# Patient Record
Sex: MALE | Race: BLACK OR AFRICAN AMERICAN | NOT HISPANIC OR LATINO | URBAN - METROPOLITAN AREA
[De-identification: names, ages, dates, MRNs, and addresses within clinical notes are randomized per-mention and may not be internally consistent; named-entity substitution may affect disease eponyms.]

---

## 2019-06-28 ENCOUNTER — EMERGENCY (EMERGENCY)
Facility: HOSPITAL | Age: 69
LOS: 1 days | Discharge: HOME | End: 2019-06-28
Attending: EMERGENCY MEDICINE | Admitting: INTERNAL MEDICINE
Payer: COMMERCIAL

## 2019-06-28 VITALS
DIASTOLIC BLOOD PRESSURE: 69 MMHG | SYSTOLIC BLOOD PRESSURE: 136 MMHG | OXYGEN SATURATION: 97 % | RESPIRATION RATE: 18 BRPM | TEMPERATURE: 99 F | HEART RATE: 56 BPM

## 2019-06-28 LAB
ALBUMIN SERPL ELPH-MCNC: 3.7 G/DL — SIGNIFICANT CHANGE UP (ref 3.3–5)
ALP SERPL-CCNC: 96 U/L — SIGNIFICANT CHANGE UP (ref 40–120)
ALT FLD-CCNC: 44 U/L — HIGH (ref 4–41)
ANION GAP SERPL CALC-SCNC: 13 MMO/L — SIGNIFICANT CHANGE UP (ref 7–14)
APTT BLD: 29.8 SEC — SIGNIFICANT CHANGE UP (ref 27.5–36.3)
AST SERPL-CCNC: 29 U/L — SIGNIFICANT CHANGE UP (ref 4–40)
BASE EXCESS BLDV CALC-SCNC: 1.2 MMOL/L — SIGNIFICANT CHANGE UP
BASOPHILS # BLD AUTO: 0.02 K/UL — SIGNIFICANT CHANGE UP (ref 0–0.2)
BASOPHILS NFR BLD AUTO: 0.4 % — SIGNIFICANT CHANGE UP (ref 0–2)
BILIRUB SERPL-MCNC: 0.5 MG/DL — SIGNIFICANT CHANGE UP (ref 0.2–1.2)
BLOOD GAS VENOUS - CREATININE: 1.29 MG/DL — SIGNIFICANT CHANGE UP (ref 0.5–1.3)
BLOOD GAS VENOUS - FIO2: 21 — SIGNIFICANT CHANGE UP
BUN SERPL-MCNC: 17 MG/DL — SIGNIFICANT CHANGE UP (ref 7–23)
CALCIUM SERPL-MCNC: 9 MG/DL — SIGNIFICANT CHANGE UP (ref 8.4–10.5)
CHLORIDE BLDV-SCNC: 110 MMOL/L — HIGH (ref 96–108)
CHLORIDE SERPL-SCNC: 105 MMOL/L — SIGNIFICANT CHANGE UP (ref 98–107)
CO2 SERPL-SCNC: 22 MMOL/L — SIGNIFICANT CHANGE UP (ref 22–31)
CREAT SERPL-MCNC: 1.3 MG/DL — SIGNIFICANT CHANGE UP (ref 0.5–1.3)
EOSINOPHIL # BLD AUTO: 0.12 K/UL — SIGNIFICANT CHANGE UP (ref 0–0.5)
EOSINOPHIL NFR BLD AUTO: 2.4 % — SIGNIFICANT CHANGE UP (ref 0–6)
GAS PNL BLDV: 135 MMOL/L — LOW (ref 136–146)
GLUCOSE BLDV-MCNC: 134 MG/DL — HIGH (ref 70–99)
GLUCOSE SERPL-MCNC: 139 MG/DL — HIGH (ref 70–99)
HCO3 BLDV-SCNC: 24 MMOL/L — SIGNIFICANT CHANGE UP (ref 20–27)
HCT VFR BLD CALC: 40 % — SIGNIFICANT CHANGE UP (ref 39–50)
HCT VFR BLDV CALC: 41.9 % — SIGNIFICANT CHANGE UP (ref 39–51)
HGB BLD-MCNC: 12.9 G/DL — LOW (ref 13–17)
HGB BLDV-MCNC: 13.7 G/DL — SIGNIFICANT CHANGE UP (ref 13–17)
IMM GRANULOCYTES NFR BLD AUTO: 0.4 % — SIGNIFICANT CHANGE UP (ref 0–1.5)
INR BLD: 1.31 — HIGH (ref 0.88–1.17)
LACTATE BLDV-MCNC: 1.8 MMOL/L — SIGNIFICANT CHANGE UP (ref 0.5–2)
LYMPHOCYTES # BLD AUTO: 1.4 K/UL — SIGNIFICANT CHANGE UP (ref 1–3.3)
LYMPHOCYTES # BLD AUTO: 27.5 % — SIGNIFICANT CHANGE UP (ref 13–44)
MCHC RBC-ENTMCNC: 30.3 PG — SIGNIFICANT CHANGE UP (ref 27–34)
MCHC RBC-ENTMCNC: 32.3 % — SIGNIFICANT CHANGE UP (ref 32–36)
MCV RBC AUTO: 93.9 FL — SIGNIFICANT CHANGE UP (ref 80–100)
MONOCYTES # BLD AUTO: 0.5 K/UL — SIGNIFICANT CHANGE UP (ref 0–0.9)
MONOCYTES NFR BLD AUTO: 9.8 % — SIGNIFICANT CHANGE UP (ref 2–14)
NEUTROPHILS # BLD AUTO: 3.03 K/UL — SIGNIFICANT CHANGE UP (ref 1.8–7.4)
NEUTROPHILS NFR BLD AUTO: 59.5 % — SIGNIFICANT CHANGE UP (ref 43–77)
NRBC # FLD: 0 K/UL — SIGNIFICANT CHANGE UP (ref 0–0)
PCO2 BLDV: 46 MMHG — SIGNIFICANT CHANGE UP (ref 41–51)
PH BLDV: 7.37 PH — SIGNIFICANT CHANGE UP (ref 7.32–7.43)
PLATELET # BLD AUTO: 191 K/UL — SIGNIFICANT CHANGE UP (ref 150–400)
PMV BLD: 11.3 FL — SIGNIFICANT CHANGE UP (ref 7–13)
PO2 BLDV: 42 MMHG — HIGH (ref 35–40)
POTASSIUM BLDV-SCNC: 4.1 MMOL/L — SIGNIFICANT CHANGE UP (ref 3.4–4.5)
POTASSIUM SERPL-MCNC: 4.6 MMOL/L — SIGNIFICANT CHANGE UP (ref 3.5–5.3)
POTASSIUM SERPL-SCNC: 4.6 MMOL/L — SIGNIFICANT CHANGE UP (ref 3.5–5.3)
PROT SERPL-MCNC: 6.3 G/DL — SIGNIFICANT CHANGE UP (ref 6–8.3)
PROTHROM AB SERPL-ACNC: 15 SEC — HIGH (ref 9.8–13.1)
RBC # BLD: 4.26 M/UL — SIGNIFICANT CHANGE UP (ref 4.2–5.8)
RBC # FLD: 15 % — HIGH (ref 10.3–14.5)
SAO2 % BLDV: 68 % — SIGNIFICANT CHANGE UP (ref 60–85)
SODIUM SERPL-SCNC: 140 MMOL/L — SIGNIFICANT CHANGE UP (ref 135–145)
TROPONIN T, HIGH SENSITIVITY: 62 NG/L — CRITICAL HIGH (ref ?–14)
WBC # BLD: 5.09 K/UL — SIGNIFICANT CHANGE UP (ref 3.8–10.5)
WBC # FLD AUTO: 5.09 K/UL — SIGNIFICANT CHANGE UP (ref 3.8–10.5)

## 2019-06-28 PROCEDURE — 99285 EMERGENCY DEPT VISIT HI MDM: CPT

## 2019-06-28 RX ORDER — ASPIRIN/CALCIUM CARB/MAGNESIUM 324 MG
162 TABLET ORAL DAILY
Refills: 0 | Status: DISCONTINUED | OUTPATIENT
Start: 2019-06-28 | End: 2019-06-29

## 2019-06-28 NOTE — ED PROVIDER NOTE - NS ED ROS FT
General: no fevers or chills  Head: no headache or lightheadedness  Eyes: no vision change  ENT: no dysphagia  CV: +exertional chest pain  Resp: +exertional SOB  GI: no N/V/D, no abdominal pain, no blood in stool  : no dysuria  MSK: no joint pain  Skin: no new rash  Neuro: no focal weakness, no change in sensation

## 2019-06-28 NOTE — ED PROVIDER NOTE - PHYSICAL EXAMINATION
General: well-appearing man in no acute distress  Head: normocephalic, atraumatic  Eyes: PERRL  Mouth: moist mucous membranes  Neck: supple neck  CV: normal rate and rhythm, normal S1 and S2  Respiratory: clear to auscultation bilaterally  Abdomen: soft, nontender, nondistended  Back: no midline tenderness to palpation, no CVAT  Neuro: alert and oriented x3, speech clear, moving all extremities spontaneously  Skin: no rash on chest  Extremities: no LE edema or tenderness to palpation, peripheral pulses 2+ bilaterally

## 2019-06-28 NOTE — ED PROVIDER NOTE - PROGRESS NOTE DETAILS
Jadyn Hyaden, resident MD: micah trop. will admit to tele for further cardiac workup. discussed with pt.

## 2019-06-28 NOTE — ED ADULT NURSE NOTE - OBJECTIVE STATEMENT
pt on bed aox3, c/o chest pain, non radiating, for 1 month or so, worse with exertion/Activity, walking few blocks associated with SOB. had been going on for a while and Relatives to visit ED to evaluate. Denies HA dizziness Palpitation N V sweating. PMHx of AICD for Bradycardia/Silent Heart Attack? HTN HLD. on cm sinus with PVC, waiting for MD to [place].

## 2019-06-28 NOTE — ED ADULT TRIAGE NOTE - CHIEF COMPLAINT QUOTE
BIBEMS from home c/o SOB and CP when he walks x 2 days. Hx: HTN, pacemaker. 162mg Aspirin and 1 nitro spray. As per pt, CP has now subsided. Appears comfortable. Respirations even/unlabored.

## 2019-06-28 NOTE — ED PROVIDER NOTE - OBJECTIVE STATEMENT
70yo man PMH afib on eliquis s/p PPM, ICD, CAD s/p stent, HTN, HLD presents with chest pain and SOB x 1 month. Pain starts when walking and resolves with rest in about 15-20 minutes,. He has intermittent diaphoresis but not usually associated with the chest pain. Pt received 162mg asa and nitro from EMS on the way to hospital. The pain is a tightness that is midsternal and does not radiate anywhere. No n/v. No trauma, no rash. No recent hospitalizations or surgeries, recently came back from [place] 3 days ago but no leg swelling or pain, fevers, or history of DVTs.  Cardiologist: Dr. Irwin, Dr. Bragg  Most recent stress test: nuclear stress test 2-3 years ago

## 2019-06-28 NOTE — ED PROVIDER NOTE - ATTENDING CONTRIBUTION TO CARE
Attending note:   After face to face evaluation of this patient, I concur with above noted hx, pe, and care plan for this patient.  70 y/o M with three stents, afl., eloquis, no asa or plavix with 1 month of daily chest pressure on walking more than half a block.     Evaluation in progress

## 2019-06-28 NOTE — ED ADULT NURSE NOTE - INTERVENTIONS DEFINITIONS
[place] to call system/Call bell, personal items and telephone within reach/Instruct patient to call for assistance

## 2019-06-29 VITALS — WEIGHT: 233.91 LBS | HEIGHT: 72 IN

## 2019-06-29 DIAGNOSIS — Z95.810 PRESENCE OF AUTOMATIC (IMPLANTABLE) CARDIAC DEFIBRILLATOR: Chronic | ICD-10-CM

## 2019-06-29 DIAGNOSIS — Z29.9 ENCOUNTER FOR PROPHYLACTIC MEASURES, UNSPECIFIED: ICD-10-CM

## 2019-06-29 DIAGNOSIS — Z98.890 OTHER SPECIFIED POSTPROCEDURAL STATES: Chronic | ICD-10-CM

## 2019-06-29 DIAGNOSIS — R07.89 OTHER CHEST PAIN: ICD-10-CM

## 2019-06-29 DIAGNOSIS — I10 ESSENTIAL (PRIMARY) HYPERTENSION: ICD-10-CM

## 2019-06-29 DIAGNOSIS — R07.9 CHEST PAIN, UNSPECIFIED: ICD-10-CM

## 2019-06-29 DIAGNOSIS — I50.9 HEART FAILURE, UNSPECIFIED: ICD-10-CM

## 2019-06-29 DIAGNOSIS — I48.91 UNSPECIFIED ATRIAL FIBRILLATION: ICD-10-CM

## 2019-06-29 DIAGNOSIS — E78.5 HYPERLIPIDEMIA, UNSPECIFIED: ICD-10-CM

## 2019-06-29 RX ORDER — ACETAMINOPHEN 500 MG
650 TABLET ORAL EVERY 6 HOURS
Refills: 0 | Status: DISCONTINUED | OUTPATIENT
Start: 2019-06-29 | End: 2019-06-29

## 2019-06-29 RX ORDER — EPLERENONE 50 MG/1
50 TABLET, FILM COATED ORAL DAILY
Refills: 0 | Status: DISCONTINUED | OUTPATIENT
Start: 2019-06-29 | End: 2019-06-29

## 2019-06-29 RX ORDER — AMLODIPINE BESYLATE 2.5 MG/1
5 TABLET ORAL DAILY
Refills: 0 | Status: DISCONTINUED | OUTPATIENT
Start: 2019-06-29 | End: 2019-06-29

## 2019-06-29 RX ORDER — METOPROLOL TARTRATE 50 MG
25 TABLET ORAL DAILY
Refills: 0 | Status: DISCONTINUED | OUTPATIENT
Start: 2019-06-29 | End: 2019-06-29

## 2019-06-29 RX ORDER — HEPARIN SODIUM 5000 [USP'U]/ML
INJECTION INTRAVENOUS; SUBCUTANEOUS
Qty: 25000 | Refills: 0 | Status: DISCONTINUED | OUTPATIENT
Start: 2019-06-29 | End: 2019-06-29

## 2019-06-29 RX ORDER — LISINOPRIL 2.5 MG/1
20 TABLET ORAL DAILY
Refills: 0 | Status: DISCONTINUED | OUTPATIENT
Start: 2019-06-29 | End: 2019-06-29

## 2019-06-29 RX ORDER — CLOPIDOGREL BISULFATE 75 MG/1
75 TABLET, FILM COATED ORAL DAILY
Refills: 0 | Status: DISCONTINUED | OUTPATIENT
Start: 2019-06-29 | End: 2019-06-29

## 2019-06-29 RX ORDER — HEPARIN SODIUM 5000 [USP'U]/ML
6000 INJECTION INTRAVENOUS; SUBCUTANEOUS EVERY 6 HOURS
Refills: 0 | Status: DISCONTINUED | OUTPATIENT
Start: 2019-06-29 | End: 2019-06-29

## 2019-06-29 RX ORDER — ASPIRIN/CALCIUM CARB/MAGNESIUM 324 MG
81 TABLET ORAL DAILY
Refills: 0 | Status: DISCONTINUED | OUTPATIENT
Start: 2019-06-29 | End: 2019-06-29

## 2019-06-29 RX ORDER — ATORVASTATIN CALCIUM 80 MG/1
80 TABLET, FILM COATED ORAL AT BEDTIME
Refills: 0 | Status: DISCONTINUED | OUTPATIENT
Start: 2019-06-29 | End: 2019-06-29

## 2019-06-29 RX ORDER — HEPARIN SODIUM 5000 [USP'U]/ML
5000 INJECTION INTRAVENOUS; SUBCUTANEOUS ONCE
Refills: 0 | Status: DISCONTINUED | OUTPATIENT
Start: 2019-06-29 | End: 2019-06-29

## 2019-06-29 RX ADMIN — Medication 162 MILLIGRAM(S): at 00:08

## 2019-06-29 NOTE — H&P ADULT - NSICDXPASTMEDICALHX_GEN_ALL_CORE_FT
PAST MEDICAL HISTORY:  Atrial fibrillation     CAD (coronary artery disease)     Congestive heart failure (CHF)     Hyperlipidemia     Hypertension

## 2019-06-29 NOTE — H&P ADULT - HISTORY OF PRESENT ILLNESS
70 y/o male, with a PmHx of Afib on Eliquis, ICD (Medtronic), CHF, CAD w/stents (last stent placed 9/18 @ [facility]), HTN, HLD, presented to the [facility] with chest pain and diaphoresis. Pt states he has been having intermittent, substernal, non-radiating, 6/10 chest pain for the past 3-4 weeks. He states he had just  come back here from [place] 3 days ago and was still having the chest pain so his children told him to go to the hospital for an evaluation. He states the pain is worse with exertion and is associated with sob and diaphoresis and when he sits down and relaxes his symptoms resolve. He denies any fever, chills, HA, dizziness, blurred vision, n/v, sick contacts. Currently, he states the chest pain is a 0/10. Pt appears comfortable at this time and was admitted to telemetry for r/o acs.

## 2019-06-29 NOTE — H&P ADULT - NEGATIVE OPHTHALMOLOGIC SYMPTOMS
no photophobia/no loss of vision L/no blurred vision L/no blurred vision R/no loss of vision R/no diplopia

## 2019-06-29 NOTE — H&P ADULT - NSICDXPASTSURGICALHX_GEN_ALL_CORE_FT
PAST SURGICAL HISTORY:  AICD (automatic cardioverter/defibrillator) present Medtronic    History of cardiac cath with 3 stents

## 2019-06-29 NOTE — PROGRESS NOTE ADULT - SUBJECTIVE AND OBJECTIVE BOX
CC: pt seen and examined, please see full H n P to follow.     68yo man PMH afib on eliquis s/p PPM, ICD, CAD s/p stent, HTN, HLD presents with chest pain and SOB x 1 month. Pain starts when walking and resolves with rest in about 15-20 minutes,. He has intermittent diaphoresis but not usually associated with these episodes. Pt currently feeling better, denies chest pain at rest    ECG Vpaced, aflutter    TELEMETRY: aflutter    PHYSICAL EXAM:    T(C): 36.6 (06-29-19 @ 06:06), Max: 37 (06-28-19 @ 18:39)  HR: 50 (06-29-19 @ 06:06) (50 - 56)  BP: 158/91 (06-29-19 @ 06:06) (131/74 - 158/91)  RR: 17 (06-29-19 @ 06:06) (16 - 20)  SpO2: 100% (06-29-19 @ 06:06) (97% - 100%)  Wt(kg): --  I&O's Summary      Appearance: Normal	  Cardiovascular: Normal S1 S2,RRR, No JVD, No murmurs  Respiratory: Lungs clear to auscultation	  Gastrointestinal:  Soft, Non-tender, + BS	  Extremities: Normal range of motion, No clubbing, cyanosis or edema  Vascular: Peripheral pulses palpable 2+ bilaterally     LABS:	 	                          12.9   5.09  )-----------( 191      ( 28 Jun 2019 22:00 )             40.0     06-28    140  |  105  |  17  ----------------------------<  139<H>  4.6   |  22  |  1.30    Ca    9.0      28 Jun 2019 22:00    TPro  6.3  /  Alb  3.7  /  TBili  0.5  /  DBili  x   /  AST  29  /  ALT  44<H>  /  AlkPhos  96  06-28      PT/INR - ( 28 Jun 2019 22:00 )   PT: 15.0 SEC;   INR: 1.31          PTT - ( 28 Jun 2019 22:00 )  PTT:29.8 SEC    CARDIAC MARKERS:

## 2019-06-29 NOTE — H&P ADULT - RS GEN PE MLT RESP DETAILS PC
good air movement/airway patent/breath sounds equal/clear to auscultation bilaterally/respirations non-labored/no chest wall tenderness

## 2019-06-29 NOTE — H&P ADULT - NSICDXFAMILYHX_GEN_ALL_CORE_FT
FAMILY HISTORY:  Family history of heart disease in brother  Family history of pancreatic cancer, Father  from pancreatic cancer

## 2019-06-29 NOTE — DISCHARGE NOTE PROVIDER - NSDCCPCAREPLAN_GEN_ALL_CORE_FT
PRINCIPAL DISCHARGE DIAGNOSIS  Diagnosis: Other chest pain  Assessment and Plan of Treatment: To be asymptomatic, to reduce risks factors such as hypertension, diabetes and hyperlipidemia to lower the risk of blood clots formation; and to prevent complications of coronary artery disease such as worsening chest pain, heart attack and death.   Continue aspirin and Plavix, do not stop unless instructed by your physician.  Continue low salt, fat, cholesterol and carbohydrate diet. Follow up with cardiologist and primary care physician's routine appointment.      SECONDARY DISCHARGE DIAGNOSES  Diagnosis: Hyperlipidemia  Assessment and Plan of Treatment: To maintain normal cholesterol levels to prevent stroke, coronary artery disease, peripheral vascular disease and heart attacks.   Low fat diet, exercise daily and continue current medications. Follow up with primary care physician and cardiologist for management.    Diagnosis: Atrial fibrillation  Assessment and Plan of Treatment: To restore or maintain a normal heart rate and rhythm, to prevent blood clots, and decrease the risks of stroke CVA/TIA.   Please take your medications as prescribed.  Continue to take your blood thinner as prescribed and follow with your physician to monitor your levels.  Low fat diet, reduce caffeine intake, and exercise at least 30 minutes daily.    Diagnosis: Congestive heart failure (CHF)  Assessment and Plan of Treatment: To relieve and prevent worsening symptoms associated with congestive heart failure, to improve quality of life, and to treat underlying conditions such as coronary heart disease, high blood pressure, or diabetes, and to maintain euvolemia.   Low salt diet, fluid restriction to 1500 ml daily, monitor your fluid intake and weight daily, exercise as tolerated 30 minutes daily, and follow up with your physician within 1 to 2 weeks.    Diagnosis: Hypertension  Assessment and Plan of Treatment: To maintain a normal blood pressure to prevent heart attack, stroke and renal failure.   Low sodium and fat diet, continue anti-hypertensive medications, and follow up with primary care physician.

## 2019-06-29 NOTE — PROGRESS NOTE ADULT - ASSESSMENT
1. NSTEMI  2. CAD s/p PCI  3. AFluttler/Fib  4. S/P PPM    -Plan for cardiac cath on Monday  -Hold oral anticoagulant and start heparin  -asa, plavix  -cont prior CV meds  -prelim CXR LLL opacity? check noncon CT chest  -med eval  -ECHO

## 2019-06-29 NOTE — PROVIDER CONTACT NOTE (OTHER) - ASSESSMENT
Pt does not want any blood drawn and is refusing the heparin drip. risks discussed with pt. Monty Langley made aware

## 2019-06-29 NOTE — H&P ADULT - PROBLEM SELECTOR PLAN 2
cont metoprolol for rate control, ICD interrogation to be called (medtronic); CHADSVASC of 4, hold eliquis for now for plan of cardiac cath on monday, started on heparin gtt

## 2019-06-29 NOTE — H&P ADULT - PROBLEM SELECTOR PLAN 1
ekg/telemetry, ce x 2, tsh, echo, plan is for cardiac cath on Monday, hold eliquis, started on heparin gtt, monitor ptt, cont asa and plavix, statin

## 2019-06-29 NOTE — DISCHARGE NOTE PROVIDER - CARE PROVIDER_API CALL
Dr. Bernard Lopes,   Cardiologist  [facility]  Phone: [phone]  Fax: (   )    -  Follow Up Time:

## 2019-06-29 NOTE — H&P ADULT - ASSESSMENT
68 y/o male, with a PmHx of Afib on Eliquis, ICD (Medtronic), CHF, CAD w/stents (last stent placed 9/18 @ [facility]), HTN, HLD, presented to the [facility] with chest pain and diaphoresis. Admitted to telemetry for r/o acs.

## 2019-06-29 NOTE — H&P ADULT - NSHPPHYSICALEXAM_GEN_ALL_CORE
Vital Signs Last 24 Hrs  T(C): 36.6 (29 Jun 2019 06:06), Max: 37 (28 Jun 2019 18:39)  T(F): 97.9 (29 Jun 2019 06:06), Max: 98.6 (28 Jun 2019 18:39)  HR: 50 (29 Jun 2019 06:06) (50 - 56)  BP: 158/91 (29 Jun 2019 06:06) (131/74 - 158/91)  BP(mean): --  RR: 17 (29 Jun 2019 06:06) (16 - 20)  SpO2: 100% (29 Jun 2019 06:06) (97% - 100%)    EKG: V-Paced @ 56, T inv I, AVL, V2

## 2019-06-29 NOTE — DISCHARGE NOTE PROVIDER - HOSPITAL COURSE
70 y/o male, with a PmHx of Afib on Eliquis, ICD (Medtronic), CHF, CAD w/stents (last stent placed 9/18 @ [facility]), HTN, HLD, presented to the [facility] with chest pain and diaphoresis. Pt states he has been having intermittent, substernal, non-radiating, 6/10 chest pain for the past 3-4 weeks. He states he had just  come back here from [place] 3 days ago and was still having the chest pain so his children told him to go to the hospital for an evaluation. He states the pain is worse with exertion and is associated with sob and diaphoresis and when he sits down and relaxes his symptoms resolve. He denies any fever, chills, HA, dizziness, blurred vision, n/v, sick contacts. Currently, he states the chest pain is a 0/10. Pt appears comfortable at this time and was admitted to telemetry for r/o acs.        Shortly after being admitted, I was called by the nurse Yary to evaluate this patient who wishes to leave the hospital against medical advice. Patient is A&O x3 and has full capacity to make his or her own medical decisions. He was refusing all his tests and labs. Family was at his bedside trying to convince him to stay as well but he was still refusing. Pt was informed of their medical conditions, benefits, and alternatives to treatment as well as the risks of refusing treatment and the seriousness of leaving against medical advice such as the risks of heart attack, stroke, seizure, and even death were fully explained to the patient.  After expressing full understanding, patient then signs out against medical advice witnessed by the nurse.  Attending made aware.

## 2019-06-29 NOTE — DISCHARGE NOTE PROVIDER - PROVIDER TOKENS
FREE:[LAST:[Dr. Bernard Lopes],PHONE:[phone]],FAX:[(   )    -],ADDRESS:[Cardiologist  [facility]]]

## 2019-06-29 NOTE — H&P ADULT - NSHPSOCIALHISTORY_GEN_ALL_CORE
Marital Status:     Occupation: Retired, used to own a car service     Tobacco Use: neg    ETOH Use: neg    Flu Vaccine:     neg                             Pneumonia Vaccine: neg

## 2022-06-20 NOTE — PATIENT PROFILE ADULT - FALL HARM RISK CONCLUSION
"Large Joint Aspiration/Injection: L knee    Date/Time: 6/20/2022 1:15 PM  Performed by: Sam Fine MD  Authorized by: Sam Fine MD     Consent Done?:  Yes (Verbal)  Indications:  Pain  Site marked: the procedure site was marked    Timeout: prior to procedure the correct patient, procedure, and site was verified    Prep: patient was prepped and draped in usual sterile fashion    Local anesthesia used?: No    Local anesthetic:  Topical anesthetic    Details:  Needle Size:  22 G  Ultrasonic Guidance for needle placement?: Yes    Images are saved and documented.  Approach: superiorlateral.  Location:  Knee  Site:  L knee  Medications:  48 mg hylan g-f 20 48 mg/6 mL  Patient tolerance:  Patient tolerated the procedure well with no immediate complications     Description of ultrasound utilization for needle guidance:   Ultrasound guidance used for needle localization. Images saved and stored for documentation. The knee joint was visualized. Dynamic visualization of the 22g x 1.5" needle was continuous throughout the procedure.       " Fall Risk

## 2022-10-26 ENCOUNTER — EMERGENCY (EMERGENCY)
Facility: HOSPITAL | Age: 72
LOS: 1 days | Discharge: HOME | End: 2022-10-26
Attending: EMERGENCY MEDICINE | Admitting: EMERGENCY MEDICINE

## 2022-10-26 VITALS
DIASTOLIC BLOOD PRESSURE: 90 MMHG | SYSTOLIC BLOOD PRESSURE: 159 MMHG | HEART RATE: 97 BPM | TEMPERATURE: 98 F | RESPIRATION RATE: 16 BRPM | OXYGEN SATURATION: 100 % | HEIGHT: 72 IN

## 2022-10-26 VITALS
RESPIRATION RATE: 16 BRPM | TEMPERATURE: 98 F | HEART RATE: 80 BPM | DIASTOLIC BLOOD PRESSURE: 89 MMHG | OXYGEN SATURATION: 100 % | SYSTOLIC BLOOD PRESSURE: 147 MMHG

## 2022-10-26 DIAGNOSIS — Z98.890 OTHER SPECIFIED POSTPROCEDURAL STATES: Chronic | ICD-10-CM

## 2022-10-26 DIAGNOSIS — Z95.810 PRESENCE OF AUTOMATIC (IMPLANTABLE) CARDIAC DEFIBRILLATOR: Chronic | ICD-10-CM

## 2022-10-26 PROBLEM — I25.10 ATHEROSCLEROTIC HEART DISEASE OF NATIVE CORONARY ARTERY WITHOUT ANGINA PECTORIS: Chronic | Status: ACTIVE | Noted: 2019-06-29

## 2022-10-26 PROBLEM — I48.91 UNSPECIFIED ATRIAL FIBRILLATION: Chronic | Status: ACTIVE | Noted: 2019-06-29

## 2022-10-26 PROBLEM — E78.5 HYPERLIPIDEMIA, UNSPECIFIED: Chronic | Status: ACTIVE | Noted: 2019-06-29

## 2022-10-26 PROBLEM — I10 ESSENTIAL (PRIMARY) HYPERTENSION: Chronic | Status: ACTIVE | Noted: 2019-06-29

## 2022-10-26 PROBLEM — I50.9 HEART FAILURE, UNSPECIFIED: Chronic | Status: ACTIVE | Noted: 2019-06-29

## 2022-10-26 LAB
ALBUMIN SERPL ELPH-MCNC: 3.5 G/DL — SIGNIFICANT CHANGE UP (ref 3.3–5)
ALP SERPL-CCNC: 116 U/L — SIGNIFICANT CHANGE UP (ref 40–120)
ALT FLD-CCNC: 31 U/L — SIGNIFICANT CHANGE UP (ref 4–41)
ANION GAP SERPL CALC-SCNC: 13 MMOL/L — SIGNIFICANT CHANGE UP (ref 7–14)
AST SERPL-CCNC: 35 U/L — SIGNIFICANT CHANGE UP (ref 4–40)
BILIRUB SERPL-MCNC: 0.7 MG/DL — SIGNIFICANT CHANGE UP (ref 0.2–1.2)
BUN SERPL-MCNC: 14 MG/DL — SIGNIFICANT CHANGE UP (ref 7–23)
CALCIUM SERPL-MCNC: 8.9 MG/DL — SIGNIFICANT CHANGE UP (ref 8.4–10.5)
CHLORIDE SERPL-SCNC: 105 MMOL/L — SIGNIFICANT CHANGE UP (ref 98–107)
CO2 SERPL-SCNC: 23 MMOL/L — SIGNIFICANT CHANGE UP (ref 22–31)
CREAT SERPL-MCNC: 1.23 MG/DL — SIGNIFICANT CHANGE UP (ref 0.5–1.3)
EGFR: 63 ML/MIN/1.73M2 — SIGNIFICANT CHANGE UP
FLUAV AG NPH QL: SIGNIFICANT CHANGE UP
FLUBV AG NPH QL: SIGNIFICANT CHANGE UP
GLUCOSE SERPL-MCNC: 127 MG/DL — HIGH (ref 70–99)
HCT VFR BLD CALC: 40.4 % — SIGNIFICANT CHANGE UP (ref 39–50)
HGB BLD-MCNC: 12.8 G/DL — LOW (ref 13–17)
MAGNESIUM SERPL-MCNC: 1.8 MG/DL — SIGNIFICANT CHANGE UP (ref 1.6–2.6)
MCHC RBC-ENTMCNC: 28.8 PG — SIGNIFICANT CHANGE UP (ref 27–34)
MCHC RBC-ENTMCNC: 31.7 GM/DL — LOW (ref 32–36)
MCV RBC AUTO: 90.8 FL — SIGNIFICANT CHANGE UP (ref 80–100)
NRBC # BLD: 0 /100 WBCS — SIGNIFICANT CHANGE UP (ref 0–0)
NRBC # FLD: 0 K/UL — SIGNIFICANT CHANGE UP (ref 0–0)
NT-PROBNP SERPL-SCNC: 3920 PG/ML — HIGH
PHOSPHATE SERPL-MCNC: 3.3 MG/DL — SIGNIFICANT CHANGE UP (ref 2.5–4.5)
PLATELET # BLD AUTO: 209 K/UL — SIGNIFICANT CHANGE UP (ref 150–400)
POTASSIUM SERPL-MCNC: 4 MMOL/L — SIGNIFICANT CHANGE UP (ref 3.5–5.3)
POTASSIUM SERPL-SCNC: 4 MMOL/L — SIGNIFICANT CHANGE UP (ref 3.5–5.3)
PROT SERPL-MCNC: 5.7 G/DL — LOW (ref 6–8.3)
RBC # BLD: 4.45 M/UL — SIGNIFICANT CHANGE UP (ref 4.2–5.8)
RBC # FLD: 14.6 % — HIGH (ref 10.3–14.5)
RSV RNA NPH QL NAA+NON-PROBE: SIGNIFICANT CHANGE UP
SARS-COV-2 RNA SPEC QL NAA+PROBE: SIGNIFICANT CHANGE UP
SODIUM SERPL-SCNC: 141 MMOL/L — SIGNIFICANT CHANGE UP (ref 135–145)
WBC # BLD: 6.36 K/UL — SIGNIFICANT CHANGE UP (ref 3.8–10.5)
WBC # FLD AUTO: 6.36 K/UL — SIGNIFICANT CHANGE UP (ref 3.8–10.5)

## 2022-10-26 PROCEDURE — 93010 ELECTROCARDIOGRAM REPORT: CPT

## 2022-10-26 PROCEDURE — 99284 EMERGENCY DEPT VISIT MOD MDM: CPT

## 2022-10-26 PROCEDURE — 71045 X-RAY EXAM CHEST 1 VIEW: CPT | Mod: 26

## 2022-10-26 RX ORDER — FUROSEMIDE 40 MG
40 TABLET ORAL ONCE
Refills: 0 | Status: COMPLETED | OUTPATIENT
Start: 2022-10-26 | End: 2022-10-26

## 2022-10-26 RX ADMIN — Medication 40 MILLIGRAM(S): at 05:45

## 2022-10-26 NOTE — ED PROVIDER NOTE - SKIN NEGATIVE STATEMENT, MLM
Flexible bronchoscopy; EBUS w/LN biopsy, frozen non-diagnostic. Mediastinoscopy via cervical approach, frozen c/w benign, reactive tissue. no abrasions, no jaundice, no lesions, no pruritis, and no rashes.

## 2022-10-26 NOTE — ED POST DISCHARGE NOTE - REASON FOR FOLLOW-UP
Other Patient family called, state that patient left ED prior to results being back. Asking for results of blood work. Family and patient aware the BNP was elevated but CXR appeared clear. Other labs wnl. State that patient still symptomatic with GRIFFITH and LE edema, aware that if patient not feeling well and or having worsening symptoms to return to ED.

## 2022-10-26 NOTE — ED PROVIDER NOTE - ATTENDING CONTRIBUTION TO CARE
73 yo M with PMH HTN HLD CAD Afib ACID and CHF presenting with 3 days of a productive cough with yellow sputum and some GRIFFITH that developed in the last 2 days.  Mild and only when taking walks.  No fevers, no sick contacts and no CP.  No reported leg swelling    Gen: Well appearing in NAD  Head: NC/AT  Neck: trachea midline  Resp:  No distress CTAB  Ext: no deformities no edema   Neuro:  A&O appears non focal  Skin:  Warm and dry as visualized  Psych:  Normal affect and mood     73 yo with PMH Heart disease and CHF presenting with cough and mild SOB.  Differential includes but is not limited to viral illness, PNA, mild CHF exacerbation.  VS are stable and appears well with no PE findings of overt failure that would require admission.  Will also get trop to look for an ACS but would be less likely.

## 2022-10-26 NOTE — ED PROVIDER NOTE - PHYSICAL EXAMINATION
GENERAL: well appearing in no acute distress, non-toxic appearing  CARDIAC: regular rate and rhythm, normal S1S2, no appreciable murmurs, 2+ pulses in UE/LE b/l  PULM: normal breath sounds, clear to ascultation bilaterally, no rales, rhonchi, wheezing  GI: Abd soft, nondistended, nontender, no rebound tenderness, no guarding, no rigidity  : no CVA tenderness b/l, no suprapubic tenderness  MSK: ROM intact, +mild b/l edema up to ankles, no calf tenderness b/l  SKIN: well-perfused, extremities warm, no visible rashes  PSYCH: appropriate mood and affect

## 2022-10-26 NOTE — ED PROVIDER NOTE - OBJECTIVE STATEMENT
72M w/ h/o 72M w/ h/o HTN, HLD, CHF, CAD, Afib, AICD who presents with several days of cough and GRIFFITH. Cough began 3 days ago, productive with yellow sputum. Over last 2 days noted some GRIFFITH while taking walks. No sick contacts. Denies CP, fever/chills, sore throat, back pain, abd pain, N/V.

## 2022-10-26 NOTE — ED PROVIDER NOTE - CLINICAL SUMMARY MEDICAL DECISION MAKING FREE TEXT BOX
72M w/ h/o HTN, HLD, CHF, CAD, Afib, AICD who presents with several days of cough and GRIFFITH. Cough began 3 days ago, productive with yellow sputum. Over last 2 days noted some GRIFFITH while taking walks. No sick contacts. Denies CP, fever/chills, sore throat, back pain, abd pain, N/V. AVSS, Lungs CTA b/l, mild b/l LE edema up to ankles, no calf tenderness or swelling. Viral URI vs PNA vs mild CHF exacerbation. Will get labs, EKG, CXR, +/- give meds, and reassess.

## 2022-10-26 NOTE — ED ADULT NURSE NOTE - CHPI ED NUR SYMPTOMS NEG
no body aches/no chest pain/no chills/no edema/no fever/no headache/no hemoptysis/no shortness of breath

## 2022-10-26 NOTE — ED PROVIDER NOTE - PATIENT PORTAL LINK FT
You can access the FollowMyHealth Patient Portal offered by [facility] by registering at the following website: [email or web address]. By joining [employer]’s FollowMyHealth portal, you will also be able to view your health information using other applications (apps) compatible with our system.

## 2022-10-26 NOTE — ED PROVIDER NOTE - NSFOLLOWUPINSTRUCTIONS_ED_ALL_ED_FT
Please follow up with your cardiologist.     Congestive Heart Failure (CHF)    Congestive heart failure is a chronic condition in which the heart has trouble pumping blood. In some cases of heart failure, fluid may back up into your lungs or you may have swelling (edema) in your lower legs. There are many causes of heart failure including high blood pressure, coronary artery disease, abnormal heart valves, heart muscle disease, lung disease, diabetes, etc. Symptoms include shortness of breath with activity or when lying flat, cough, swelling of the legs, fatigue, or increased urination during the night.     Treatment is aimed at managing the symptoms of heart failure and may include lifestyle changes, medications, or surgical procedures. Take medicines only as directed by your health care provider and do not stop unless instructed to do so. Eat heart-healthy foods with low or no trans/saturated fats, cholesterol and salt. Weigh yourself every day for early recognition of fluid accumulation.    SEEK IMMEDIATE MEDICAL CARE IF YOU HAVE ANY OF THE FOLLOWING SYMPTOMS: shortness of breath, change in mental status, chest pain, lightheadedness/dizziness/fainting, or worsening of symptoms including not being able to conduct normal physical activity.

## 2022-12-12 ENCOUNTER — EMERGENCY (EMERGENCY)
Facility: HOSPITAL | Age: 72
LOS: 1 days | Discharge: HOME | End: 2022-12-12
Attending: EMERGENCY MEDICINE | Admitting: EMERGENCY MEDICINE

## 2022-12-12 VITALS
TEMPERATURE: 98 F | DIASTOLIC BLOOD PRESSURE: 98 MMHG | HEIGHT: 72 IN | SYSTOLIC BLOOD PRESSURE: 173 MMHG | RESPIRATION RATE: 15 BRPM | OXYGEN SATURATION: 99 % | HEART RATE: 86 BPM

## 2022-12-12 VITALS
HEART RATE: 67 BPM | TEMPERATURE: 98 F | SYSTOLIC BLOOD PRESSURE: 140 MMHG | OXYGEN SATURATION: 99 % | RESPIRATION RATE: 18 BRPM | DIASTOLIC BLOOD PRESSURE: 75 MMHG

## 2022-12-12 DIAGNOSIS — Z98.890 OTHER SPECIFIED POSTPROCEDURAL STATES: Chronic | ICD-10-CM

## 2022-12-12 DIAGNOSIS — Z95.810 PRESENCE OF AUTOMATIC (IMPLANTABLE) CARDIAC DEFIBRILLATOR: Chronic | ICD-10-CM

## 2022-12-12 PROCEDURE — 93010 ELECTROCARDIOGRAM REPORT: CPT

## 2022-12-12 PROCEDURE — 99284 EMERGENCY DEPT VISIT MOD MDM: CPT

## 2022-12-12 RX ORDER — TETANUS TOXOID, REDUCED DIPHTHERIA TOXOID AND ACELLULAR PERTUSSIS VACCINE, ADSORBED 5; 2.5; 8; 8; 2.5 [IU]/.5ML; [IU]/.5ML; UG/.5ML; UG/.5ML; UG/.5ML
0.5 SUSPENSION INTRAMUSCULAR ONCE
Refills: 0 | Status: COMPLETED | OUTPATIENT
Start: 2022-12-12 | End: 2022-12-12

## 2022-12-12 NOTE — ED PROVIDER NOTE - PHYSICAL EXAMINATION
Gen: WDWN, NAD, comfortable appearing, hemodynamically stable   HEENT: No nasal discharge, mucous membranes moist  CV: RRR (paced) , +S1/S2, no M/R/G, equal b/l radial pulses 2+  Resp: CTAB, no W/R/R, no increased WOB   GI: Abdomen soft non-distended, NTTP, no masses/organomegaly   MSK/Skin: Mild oozing of blood from left chest PPM site with intact sutures, no erythema or pus drainage from site   Neuro: A&Ox4, moving all 4 extremities spontaneously, gross sensation intact in UE and LE BL  Psych: appropriate mood

## 2022-12-12 NOTE — ED PROVIDER NOTE - ATTENDING CONTRIBUTION TO CARE
The patient is a 72y Male who has a past medical and surgery history of HTN DM CAD/cath/3stents HLD sCHF/AICD AF (Xarelto) PTED with bleeding to her PPM site after battery change at [facility]. Denies CP dizziness or SOB     Vital Signs Last 24 Hrs  T(F): 98.2 HR: 86 BP: 173/98 RR: 15 SpO2: 99% (12 Dec 2022 21:50)   PE: as described; my additions and exceptions are noted in the chart    DATA:  EKG: PPM@79 ventricularly paced no change c/w 10/26/22  LAB: Pending at time of evaluation    IMPRESSION/RISK:  Dx=mild bleeding from wound    Consideration include minimal hemostasis needed pt educated to f/u as outpt RTED PRN; concerns about BP produced   Plan  As above

## 2022-12-12 NOTE — ED ADULT NURSE NOTE - OBJECTIVE STATEMENT
Patient received in trauma room B. Patient presents to the ED c/o bleeding to surgical site. Patient is A&OX4 and ambulatory at baseline. PMH A-fib, CHF, HLD, CAD, and HTN; patient states he takes Xarelto as prescribed. Patient states "the battery to my pacemaker was replaced 3 weeks ago and today the surgical site suddenly started to bleed". Surgical site on left chest wall not actively bleeding at this time; site is clean and intact, no redness, no warmth, and no exudate present. Patient denies pain to the site. VSS, airway patent, respirations even and unlabored. Patient denies dyspnea, shortness of breath, and chest pain. Abdomen flat, soft and non-distended; patient denies changes to BMs and urine. Pulse/motor/sensory present in all four extremities. Paced normal sinus rhythm on tele. monitor, will continue to monitor.

## 2022-12-12 NOTE — ED ADULT NURSE NOTE - CHIEF COMPLAINT QUOTE
alert oriented no distress had battery changed for PPM at [place] and site is bleeding no c/o CP dizziness or SOB PMHx PPM DM takes xarelto

## 2022-12-12 NOTE — ED PROVIDER NOTE - TOBACCO USE
[facility] [place]  [facility]  Advanced Heart Failure Visit      Date of visit: 11/13/2017  Patient Name: Rishi Pate  Medical Record Number: [id number]  YOB: 1954   Primary Physician: No primary care provider on file.    CHIEF COMPLAINT:    Chief Complaint   Patient presents with   • Transitional Care Management     Hospital Discharge Follow-Up       SUBJECTIVE     HISTORY OF PRESENT ILLNESS:  Rishi Pate is a 63 year old male who presents to the clinic with the following CV  (cardiovascular)  history:    6/10-8/29/2017: presented to an OSH on 6/10/17 for NSTEMI and subsequently transferred to [facility] for advanced heart failure options due to no PCI or bypass options available.  Patient underwent HeartMate 3 LVAD (as destination therapy) on 6/20/17.  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 Diff infection and e. coli UTI that were treated by infectious diease team. On discharge, ID recommendations patient to continue on PO Keflex 500 mg QID for 5 more days (to be complete on 9/3/17) as well as prophylactic PO vanco for an additional 10 days (to be completed on 9/8/17).  DC to [facility].  9/18 - 9/29/2017 Admitted on 9/18/17 from [facility] ([facility]) after having multiple issues with care at  their facility.  He was found to have significant bleeding from his driveline site. INR on admission was 4.2 (goal was 2-3).  Coumadin dose was initially held on 9/18/17 and resumed on 9/19/17.  Driveline culture was on 9/18/17 and 9/21/17 send given erythema, both resulted as negative for any organism growth. Patient under went abdominal CT on 9/21/17 for evaluation of RUQ pain around LVAD driveline which showed unremarkable appearance of the drive line. Surgicel was paced at bleeding area on driveline site and abdominal binder was placed.  Stool was CDiff positive on 9/19/17, ID was consulted and patient was placed on PO Vanco (to be complete on 10/2/17).   INR became subtherapeutic and patient was placed on heparin infusion for bridging.  On 9/29/17 patient's INR was therapeutic at 2.2 and discharge to [facility] was coordinated by .  MAP goal is 60-90.  10/20-11/10/2017: Admitted with GI bleed, supratherapeutic INR and anemia requiring transfusion of 1 unit PRBC.  Colonoscopy and Endoscopy Were Unremarkable. Warfarin and Aspirin were resumed without further bleeding.  Patient was hypovolemic with stacked PI events. LVAD speed decreased from 2818-6456 rpm's and torsemide held. Torsemide was resumed during hospitalization and patient became orthostatic, thus diuretics were discontinued.  Midrin 15 mg t.i.d. Continued.  Patient with episode of lethargy and bradycardia due to an increase in tizanidine. This was transitioned to Flexeril. Patient will need follow-up with Dr. Cy Fournier, neuro-ophthalmology for baseline visual field testing.  Will follow up in clinic for octreotide injection.  Discharged to [facility].    Currently treated on: midodrine 15 mg TID, metoprolol XL 25 mg once daily     Since Discharge:  He is accompanied by wife  He is now at [facility].    She presents to clinic today stating he continues to make good progress with rehabilitation. He denies shortness  of breath at rest, with ADLs or with working with therapies. He continues to be deconditioned and was brought to clinic today via ambulance transport. He denies chest pain, palpitations, dizziness, lightheadedness, orthopnea, PND, lower extremity edema or abdominal bloating.    His medications are administered by [facility] rehabilitation staff.    He denies any alarms and LVAD. Per the history events have decreased significantly since adjustments were made to his diuretic regimen and his LVAD speed was decreased in the hospital. He currently has a proximally 5-14 PI events per day. He did have some stacked events while working with physical therapy. [facility] debilitation staff change his driveline dressing daily.  Patient denies any erythema, drainage, induration or foul odor.    HEART FAILURE MEDICATIONS   [] ACEi [] ARB [x] BB (beta blocker) [] CCB (calcium channel blocker)   [] Corlanor [] DIG (digoxin) [] Diuretic  [] Entresto [] Hydralazine [] MRA [] Nitrate      Midodrine  No ACEi/ARB on account of hypotension.    DEVICES   [] ICD (implantable cardioverter defibrillator)  [] BIV-ICD (biventricular)  [] PPM (permament pacemaker)  [] Heartmate 2 LVAD (left ventricular assist device) [x] Heartmate 3 LVAD [] Heartware LVAD  [] CardioMEMS    Frequency / Description   []  YES    [x]  NO Angina:   []  YES    [x]  NO Claudication:   []  YES    [x]  NO Syncope:   []  YES    [x]  NO Orthopnea:   []  YES    [x]  NO PND (paroxysmal nocturnal dyspnea):  []  YES    [x]  NO Pedal edema:   []  YES    [x]  NO Abdominal swelling:   []  YES    [x]  NO Early satiety:   []  YES    [x]  NO Hospitalization:   []  YES    [x]  NO Emergency room visit:   []  YES    [x]  NO Weight gain:   []  YES    [x]  NO Drive line drainage:  []  YES    [x]  NO Bleeding:  []  YES    [x]  NO Other:    COMPLIANCE   Description   [x]  YES    []  NO Medication:   [x]  YES    []  NO Diet:    REVIEW OF SYSTEMS:  6 point review of systems was  completed and is negative except as stated above.    MEDICATIONS  Outpatient Prescriptions Marked as Taking for the 11/13/17 encounter (Office Visit) with MERVAT Garcia   Medication Sig Dispense Refill   • metoPROLOL (TOPROL-XL) 25 MG 24 hr tablet Take 1 tablet by mouth daily. 60 tablet 3   • midodrine (PROAMATINE) 10 MG tablet Take 1.5 tablets by mouth 3 times daily (before meals). 30 tablet 3   • warfarin (COUMADIN) 2 MG tablet Take 1.5 tablets by mouth daily. Take 3 mg nightly on 11/10, 11/11, and 11/12  Get INR checked on Monday 11/13 - further coumadin dosing per [facility]     • cyclobenzaprine (FLEXERIL) 5 MG tablet Take 1 tablet by mouth 3 times daily. 30 tablet 0   • gabapentin (NEURONTIN) 100 MG capsule Take 1 capsule by mouth daily.     • gabapentin (NEURONTIN) 400 MG capsule Take 1 capsule by mouth nightly.     • insulin glargine (LANTUS) 100 UNIT/ML injectable solution Inject 23 Units into the skin daily. 15 mL 1   • insulin lispro (HUMALOG) 100 UNIT/ML correction dose Inject 8 Units into the skin 3 times daily (before meals). if BG <100 subtract 2 units,  If BG >150 add1 units, >200 add 2 units, >250 add 3 units, >300 +4, > 350+5 units 1 vial 1   • bisacodyl (DULCOLAX) 10 MG suppository Place 10 mg rectally daily as needed for Constipation.     • acetaminophen (TYLENOL) 500 MG tablet Take 1 tablet by mouth 4 times daily.     • Magnesium Hydroxide (MILK OF MAGNESIA PO) Take 30 mLs by mouth daily as needed.     • sodium biphosphate (FLEET) 7-19 GM/118ML enema Place 1 enema rectally daily as needed for Constipation.     • omeprazole (PRILOSEC) 20 MG capsule Take 20 mg by mouth daily.     • octreotide DEPOT (SANDOSTATIN LAR) 20 MG injection Inject 20 mg into the muscle every 28 days. Given in Advanced [facility] Failure Clinic.     • amiodarone (PACERONE,CORDARONE) 200 MG tablet Take 1 tablet by mouth daily. 30 tablet 3   • atorvastatin (LIPITOR) 20 MG tablet Take 1 tablet by mouth nightly. 30 tablet 3    • ferrous sulfate 325 (65 FE) MG tablet Take 1 tablet by mouth daily (with breakfast). 30 tablet 3   • lactobacillus acidophilus (BACID) Take 1 tablet by mouth 2 times daily.  120 tablet 3   • tamsulosin (FLOMAX) 0.4 MG Cap Take 2 capsules by mouth daily after a meal. 60 capsule 3   • traMADol (ULTRAM) 50 MG tablet Take 1 tablet by mouth every 6 hours as needed for Pain. 30 tablet 0       ALLERGY  ALLERGIES:   Allergen Reactions   • Contrast Media HIVES   • Benzoic Acid HIVES   • Fentanyl Other (See Comments)     Probably not an allergy  Fever of unknown origin   • Ioxaglate Sodium [Ioxaglate] Cough   • Metrizamide Cough   • Naproxen Nausea & Vomiting       OBJECTIVE  PAST HISTORY:  I have reviewed the past medical history, family history, social history, medications and allergies listed in the medical record as obtained by my nursing staff and support staff and agree with their documentation.    PHYSICAL EXAMINATION:  Vitals:  Unable to obtain weight as patient is unable to stand  Visit Vitals  Pulse 65   Resp 18   SpO2 99%      BMI: There is no height or weight on file to calculate BMI.  Constitutional: frail 63 year old male in no acute distress.  Skin: Warm, dry, intact, no lesions.  HEENT: Normocephalic, atraumatic. Oral mucous membranes moist, EOMs (extraocular movements) intact.  Neck: Supple, trachea midline, Patient in wheelchair, unable to accurately access JVD negative carotid bruits bilateral.  No thyromegaly.  Cardiovascular: Normal S1, S2. Obscurred by LVAD tones.   Respiratory: Anterior/posterior lung sounds Clear  to auscultation bilaterally.   Abdomen: Soft, nontender, negative hepatomegaly and normal bowel sounds.  Musculoskeletal/Extremities: CRT (capillary refill time) <3, no clubbing, no cyanosis, trace nonpitting BLE (bilateral lower extremities).  Neurological: Right upper extremity paresis, right lower extremity weakness, left for extremity weakness.   Speech normal.   Psychiatric: Alert  and oriented to person, place and time.    LABORATORY:  Lab Results   Component Value Date    POTASSIUM 4.0 11/13/2017    SODIUM 139 11/13/2017    CO2 25 11/13/2017    CHLORIDE 104 11/13/2017    BUN 16 11/13/2017    CREATININE 1.19 (H) 11/13/2017    GLUCOSE 122 (H) 11/13/2017    CALCIUM 9.0 11/13/2017    WBC 6.5 11/13/2017    RBC 3.65 (L) 11/13/2017    HCT 34.3 (L) 11/13/2017    HGB 10.7 (L) 11/13/2017     11/13/2017    TSH 3.114 11/08/2017    CHOLESTEROL 83 09/26/2017    HDL 33 (L) 06/12/2017    CHOHDL 3.4 06/12/2017    TRIGLYCERIDE 172 (H) 06/25/2017    CALCLDL 43 06/12/2017    INR 2.7 11/13/2017    PSA 0.09 06/12/2017       DIAGNOSTICS:  The following diagnostics were reviewed:  Cardiac  9/25/2017 ECHO  Patient is s/p HeartMate III (5650 RPM).  Inflow cannula: velocity 1.07 m/s  Outflow cannula: velocity 0.96 m/s  Severe LV systolic dysfunction.  Right ventricular enlargement with moderate RV systolic dysfunction.  The AV does not open. Trace aortic valve regurgitation.  Incomplete TR Doppler envelope precludes accurate assessment of PASP.     8/18/2017 [facility]  HEMODYNAMIC DATA  BP= mean 65 mmHg                HR= 74  RA= 2 mmHg                 RV= 15/0 mmHg                         PAP= 15/7 mmHg                    Sat= 59.1%  PCWP= 2 mmHg  TPG= 7.66 mmHg                     PVR= 1.95 mccoy  Thermodilution Cardiac output (L/min)/cardiac index (L/min/m2) 3.93/1.86.  ANTONIETA Cardiac output (L/min)/cardiac index (L/min/m2) 4.87/2.31.    8/10/2017 Limited ECHO  Patient is s/p Heartmate III LVAD.  Inflow cannula: velocity 0.9 m/s  Outflow cannula: velocity 1 m/s  Severely decreased left ventricular systolic function.  Moderately decreased right ventricular systolic function.  The AV does not open. No aortic valve regurgitation.  No pericardial effusion.    7/25/2017 ECHO  Limited LVAD study  Since the last study, the RV function is better. No signficant change since the prior study.    Non-Cardiac   CT Abdomen  10/9/2017  IMPRESSION:  No evidence of splenic contusion or laceration. No ascites  Fluid.      ASSESSMENT/PLAN:  Ischemic Cardiomyopathy: ACC/ AHA Stage D,   New York Heart Association Classification: NYHA Class III: Significant HF symptoms/activity intolerance (Only able to do light housework, can walk slowly on level ground) (deconditioning)   Volume status is low   Perfusion status is normal.   Labs Reviewed.     Based on objective data and clinical data will augment medical therapy as follows:   OMT (optimal medical therapy): Decrease Midodrine 5 mg twice a  day, then in 1 week decrease to daily then in 1 week stop if no increase  is dizziness/lightheadedness. Continue on remaining medications  No ACEi/ARB on account of hypotension.  ICD (implantable cardioverter defibrillator): None  Teaching: Record weights on a daily basis.    Call with a weight gain greater than 3 pounds in one day or 5 pounds in one week.   Follow a 2 gram sodium (2000mg)/2 liter (64 ounces) fluid restricted diet.   Avoid the use of NSAID including but not limited to Ibuprofen, Advil and Aleve.     HM3 LVAD-DT   MAP 60-90 88 mmHg today   INR 2.0-3.0 2.7   ASA  (aspirin) 81 mg   Drive line  Well maintained   LVAD appear to be functioning well per programming. No changes made    Anticoagulated on Coumadin: INR 2.7.  Coumadin 3 mg daily, next INR on 11/20/2017. Managed by [facility] clinic.     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 Completed oral vancomycin for recent CDiff colitis.    History of Atrial and Ventricular Arrhythmias: On amiodarone 200 mg/beta blocker/warfarin: Toprol XL 25 (for rate control)    Orthostatic hypotension:  Continue midodrine 15 mg TID.  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  Will need to follow up with Dr. Cy Fournier, neuro-ophthalmology for visual field testing.    GI  Bleed: Stable. Capsule endoscopy and colonoscopy unremarkable for acute bleeding.  Continue octreotide    Left rib pain: refer to pain management.    Follow-up:  LVAD labs in one week.    Clinic: 2 weeks MD/NP  Testing: LVAD Labs    Visit coordinated by: NATE Bailey.     Patient understands he can return sooner if any issues should arise.     MERVAT Garcia       Never smoker

## 2022-12-12 NOTE — ED ADULT NURSE NOTE - NSIMPLEMENTINTERV_GEN_ALL_ED
Implemented All Fall with Harm Risk Interventions:  [place] to call system. Call bell, personal items and telephone within reach. Instruct patient to call for assistance. Room bathroom lighting operational. Non-slip footwear when patient is off stretcher. Physically safe environment: no spills, clutter or unnecessary equipment. Stretcher in lowest position, wheels locked, appropriate side rails in place. Provide visual cue, wrist band, yellow gown, etc. Monitor gait and stability. Monitor for mental status changes and reorient to person, place, and time. Review medications for side effects contributing to fall risk. Reinforce activity limits and safety measures with patient and family. Provide visual clues: red socks.

## 2022-12-12 NOTE — ED PROVIDER NOTE - NSFOLLOWUPINSTRUCTIONS_ED_ALL_ED_FT
Sutured Wound Care    Sutures are stitches that can be used to close wounds. Taking care of your wound properly can help to prevent pain and infection. It can also help your wound to heal more quickly. Follow instructions from your health care provider about how to care for your sutured wound.    Supplies needed:  Soap and water.  A clean bandage (dressing), if needed.  Antibiotic ointment.  A clean towel.    How to care for your sutured wound     Keep the wound completely dry for the first 24 hours, or for as long as directed by your health care provider. After 24–48 hours, you may shower or bathe as directed by your health care provider. Do not soak or submerge the wound in water until the sutures have been removed.  After the first 24 hours, clean the wound once a day, or as often as directed by your health care provider, using the following steps:    Wash the wound with soap and water.  Rinse the wound with water to remove all soap.  Pat the wound dry with a clean towel. Do not rub the wound.  After cleaning the wound, apply a thin layer of antibiotic ointment as directed by your health care provider. This will prevent infection and keep the dressing from sticking to the wound.  Follow instructions from your health care provider about how to change your dressing:    Wash your hands with soap and water. If soap and water are not available, use hand .  Change your dressing at least once a day, or as often as told by your health care provider. If your dressing gets wet or dirty, change it.  Leave sutures and other skin closures, such as adhesive tape or skin glue, in place. These skin closures may need to stay in place for 2 weeks or longer. If adhesive strip edges start to loosen and curl up, you may trim the loose edges. Do not remove adhesive strips completely unless your health care provider tells you to do that.  Check your wound every day for signs of infection. Watch for:    Redness, swelling, or pain.  Fluid or blood.  Warmth.  Pus or a bad smell.  Have the sutures removed as directed by your health care provider.    Follow these instructions at home:      Medicines    Take or apply over-the-counter and prescription medicines only as told by your health care provider.  If you were prescribed an antibiotic medicine or ointment, take or apply it as told by your health care provider. Do not stop using the antibiotic even if your condition improves.        General instructions    To help reduce scarring after your wound heals, cover your wound with clothing or apply sunscreen of at least 30 SPF whenever you are outside.  Do not scratch or pick at your wound.  Avoid stretching your wound.  Raise (elevate) the injured area above the level of your heart while you are sitting or lying down, if possible.  Drink enough fluids to keep your urine clear or pale yellow.  Keep all follow-up visits as told by your health care provider. This is important.    Contact a health care provider if:  You received a tetanus shot and you have swelling, severe pain, redness, or bleeding at the injection site.  Your wound breaks open.  You have redness, swelling, or pain around your wound.  You have fluid or blood coming from your wound.  Your wound feels warm to the touch.  You have a fever.  You notice something coming out of your wound, such as wood or glass.  You have pain that does not get better with medicine.  The skin near your wound changes color.  You need to change your dressing very frequently due to a lot of fluid, blood, or pus draining from the wound.  You develop a new rash.  You develop numbness around the wound.    Get help right away if:  You develop severe swelling around your wound.  You have pus or a bad smell coming from your wound.  Your pain suddenly gets worse and is severe.  You develop painful lumps near your wound or anywhere on your body.  You have a red streak going away from your wound.  The wound is on your hand or foot and:    You cannot properly move a finger or toe.  Your fingers or toes look pale or bluish.  You have numbness that is spreading down your hand, foot, fingers, or toes.    Summary  Sutures are stitches that can be used to close wounds.  Taking care of your wound properly can help to prevent pain and infection.  Keep the wound completely dry for the first 24 hours, or for as long as directed by your health care provider. After 24–48 hours, you may shower or bathe as directed by your health care provider.    ADDITIONAL NOTES AND INSTRUCTIONS    Please follow up with your Primary MD in 24-48 hr.  Seek immediate medical care for any new/worsening signs or symptoms.

## 2022-12-12 NOTE — ED ADULT TRIAGE NOTE - CHIEF COMPLAINT QUOTE
alert oriented no distress had battery changed for PPM at [place] and site is bleeding no c/o CP dizziness or SOB PMHx PPM DM takes xarelto

## 2022-12-12 NOTE — ED PROVIDER NOTE - CARE PLAN
1 Principal Discharge DX:	Bleeding from wound   Principal Discharge DX:	Bleeding from wound  Secondary Diagnosis:	HTN (hypertension)

## 2022-12-12 NOTE — ED PROVIDER NOTE - PATIENT PORTAL LINK FT
You can access the FollowMyHealth Patient Portal offered by [facility] by registering at the following website: [email or web address]. By joining [facility]’s FollowMyHealth portal, you will also be able to view your health information using other applications (apps) compatible with our system.

## 2022-12-12 NOTE — ED PROVIDER NOTE - CLINICAL SUMMARY MEDICAL DECISION MAKING FREE TEXT BOX
71 y/o male with pmhx of HTN, HLD, CHF, CAD, Afib, AICD presenting with bleeding PPM site, hemodynamically stable, mild oozing of blood from left chest PPM site, no erythema or pus drainage from site, well appearing, EKG shows paced rhythm. Hemostasis achieved with direct pressure but surgicel also applied. Adacel given for infection ppx. Will d/c with return precautions. No indication for labs at this time given minimal bleeding/asymptomatic.

## 2022-12-18 ENCOUNTER — EMERGENCY (EMERGENCY)
Facility: HOSPITAL | Age: 72
LOS: 1 days | Discharge: HOME | End: 2022-12-18
Attending: STUDENT IN AN ORGANIZED HEALTH CARE EDUCATION/TRAINING PROGRAM | Admitting: STUDENT IN AN ORGANIZED HEALTH CARE EDUCATION/TRAINING PROGRAM

## 2022-12-18 VITALS
HEART RATE: 88 BPM | TEMPERATURE: 98 F | RESPIRATION RATE: 18 BRPM | HEIGHT: 72 IN | SYSTOLIC BLOOD PRESSURE: 162 MMHG | OXYGEN SATURATION: 98 % | DIASTOLIC BLOOD PRESSURE: 85 MMHG

## 2022-12-18 DIAGNOSIS — Z98.890 OTHER SPECIFIED POSTPROCEDURAL STATES: Chronic | ICD-10-CM

## 2022-12-18 DIAGNOSIS — Z95.810 PRESENCE OF AUTOMATIC (IMPLANTABLE) CARDIAC DEFIBRILLATOR: Chronic | ICD-10-CM

## 2022-12-18 LAB
ALBUMIN SERPL ELPH-MCNC: 3.8 G/DL — SIGNIFICANT CHANGE UP (ref 3.3–5)
ALP SERPL-CCNC: 101 U/L — SIGNIFICANT CHANGE UP (ref 40–120)
ALT FLD-CCNC: 19 U/L — SIGNIFICANT CHANGE UP (ref 4–41)
ANION GAP SERPL CALC-SCNC: 10 MMOL/L — SIGNIFICANT CHANGE UP (ref 7–14)
APTT BLD: 33.3 SEC — SIGNIFICANT CHANGE UP (ref 27–36.3)
AST SERPL-CCNC: 14 U/L — SIGNIFICANT CHANGE UP (ref 4–40)
BASOPHILS # BLD AUTO: 0.04 K/UL — SIGNIFICANT CHANGE UP (ref 0–0.2)
BASOPHILS NFR BLD AUTO: 0.7 % — SIGNIFICANT CHANGE UP (ref 0–2)
BILIRUB SERPL-MCNC: 0.5 MG/DL — SIGNIFICANT CHANGE UP (ref 0.2–1.2)
BUN SERPL-MCNC: 14 MG/DL — SIGNIFICANT CHANGE UP (ref 7–23)
CALCIUM SERPL-MCNC: 9.3 MG/DL — SIGNIFICANT CHANGE UP (ref 8.4–10.5)
CHLORIDE SERPL-SCNC: 101 MMOL/L — SIGNIFICANT CHANGE UP (ref 98–107)
CO2 SERPL-SCNC: 28 MMOL/L — SIGNIFICANT CHANGE UP (ref 22–31)
CREAT SERPL-MCNC: 1.52 MG/DL — HIGH (ref 0.5–1.3)
EGFR: 49 ML/MIN/1.73M2 — LOW
EOSINOPHIL # BLD AUTO: 0.08 K/UL — SIGNIFICANT CHANGE UP (ref 0–0.5)
EOSINOPHIL NFR BLD AUTO: 1.4 % — SIGNIFICANT CHANGE UP (ref 0–6)
GLUCOSE SERPL-MCNC: 102 MG/DL — HIGH (ref 70–99)
HCT VFR BLD CALC: 40.3 % — SIGNIFICANT CHANGE UP (ref 39–50)
HGB BLD-MCNC: 12.9 G/DL — LOW (ref 13–17)
IANC: 4 K/UL — SIGNIFICANT CHANGE UP (ref 1.8–7.4)
IMM GRANULOCYTES NFR BLD AUTO: 0.4 % — SIGNIFICANT CHANGE UP (ref 0–0.9)
INR BLD: 1.44 RATIO — HIGH (ref 0.88–1.16)
LYMPHOCYTES # BLD AUTO: 1.04 K/UL — SIGNIFICANT CHANGE UP (ref 1–3.3)
LYMPHOCYTES # BLD AUTO: 18.3 % — SIGNIFICANT CHANGE UP (ref 13–44)
MCHC RBC-ENTMCNC: 28.8 PG — SIGNIFICANT CHANGE UP (ref 27–34)
MCHC RBC-ENTMCNC: 32 GM/DL — SIGNIFICANT CHANGE UP (ref 32–36)
MCV RBC AUTO: 90 FL — SIGNIFICANT CHANGE UP (ref 80–100)
MONOCYTES # BLD AUTO: 0.51 K/UL — SIGNIFICANT CHANGE UP (ref 0–0.9)
MONOCYTES NFR BLD AUTO: 9 % — SIGNIFICANT CHANGE UP (ref 2–14)
NEUTROPHILS # BLD AUTO: 4 K/UL — SIGNIFICANT CHANGE UP (ref 1.8–7.4)
NEUTROPHILS NFR BLD AUTO: 70.2 % — SIGNIFICANT CHANGE UP (ref 43–77)
NRBC # BLD: 0 /100 WBCS — SIGNIFICANT CHANGE UP (ref 0–0)
NRBC # FLD: 0 K/UL — SIGNIFICANT CHANGE UP (ref 0–0)
PLATELET # BLD AUTO: 264 K/UL — SIGNIFICANT CHANGE UP (ref 150–400)
POTASSIUM SERPL-MCNC: 3.5 MMOL/L — SIGNIFICANT CHANGE UP (ref 3.5–5.3)
POTASSIUM SERPL-SCNC: 3.5 MMOL/L — SIGNIFICANT CHANGE UP (ref 3.5–5.3)
PROT SERPL-MCNC: 6.8 G/DL — SIGNIFICANT CHANGE UP (ref 6–8.3)
PROTHROM AB SERPL-ACNC: 16.8 SEC — HIGH (ref 10.5–13.4)
RBC # BLD: 4.48 M/UL — SIGNIFICANT CHANGE UP (ref 4.2–5.8)
RBC # FLD: 15 % — HIGH (ref 10.3–14.5)
SODIUM SERPL-SCNC: 139 MMOL/L — SIGNIFICANT CHANGE UP (ref 135–145)
TROPONIN T, HIGH SENSITIVITY RESULT: 34 NG/L — SIGNIFICANT CHANGE UP
WBC # BLD: 5.69 K/UL — SIGNIFICANT CHANGE UP (ref 3.8–10.5)
WBC # FLD AUTO: 5.69 K/UL — SIGNIFICANT CHANGE UP (ref 3.8–10.5)

## 2022-12-18 PROCEDURE — 99285 EMERGENCY DEPT VISIT HI MDM: CPT

## 2022-12-18 PROCEDURE — 71046 X-RAY EXAM CHEST 2 VIEWS: CPT | Mod: 26

## 2022-12-18 NOTE — ED PROVIDER NOTE - PHYSICAL EXAMINATION
GEN: Patient awake alert NAD.   HEENT: normocephalic, atraumatic, PERRL, EOMI, no scleral icterus, moist MM  CARDIAC: AICD in left upper chest no bleeding, S1, S2, no murmur.   PULM: CTA B/L no wheeze, rhonchi, rales.   ABD: soft NT, ND, no rebound no guarding, no CVA tenderness.   MSK: Moving all extremities, no edema. 5/5 strength and full ROM in all extremities.     NEURO: A&Ox3, gait normal, no focal neurological deficits, CN 2-12 grossly intact  SKIN: warm, dry, no rash.

## 2022-12-18 NOTE — ED PROVIDER NOTE - PROGRESS NOTE DETAILS
INES Rivera (PGY-3) - work up not actionable, trops stable. Pt feeling improved w/o intervention here, no eye pressure anymore. Given his exam and transient nature of his symptoms, do no suspect intracranial pathology including increased ICP causing papilledema. Will dc. He will follow up with his cardiologist.

## 2022-12-18 NOTE — ED ADULT NURSE NOTE - OBJECTIVE STATEMENT
Patient received in ED bed 6A. A&Ox4 and amb. C/o bilateral back of eye pressure since earlier today. Pt also states had palpitations earlier today. Pt appears comfortable laying in bed. Resp even and unlabored. No apparent distress noted. Denies CP, SOB, nausea, vomiting, headache, lightheadedness, dizziness, fever or chills. Speaking in full and complete sentences. NSR on bedside cardiac monitor. Daughter at bedside. Bed in lowest position, wheels locked, side rails up, safety maintained. Awaiting further orders.

## 2022-12-18 NOTE — ED PROVIDER NOTE - CLINICAL SUMMARY MEDICAL DECISION MAKING FREE TEXT BOX
72-year-old male history of hypertension, A. fib status post AICD implant, HLD, CAD, Presents after feeling heart racing around 7 PM tonight.  Patient endorsed pressure behind his eyes but denied any dizziness, chest pressure, shortness of breath, vision changes. Atrial paced on EKG and unchanged form prior. Will check labs ot rule out ACS, reassess.

## 2022-12-18 NOTE — ED PROVIDER NOTE - ATTENDING CONTRIBUTION TO CARE
I have personally performed a face to face medical and diagnostic evaluation of the patient. I have discussed with and reviewed the Resident's note and agree with the History, ROS, Physical Exam and MDM unless otherwise indicated. A brief summary of my personal evaluation and impression can be found below.    Mallika SMITH: 72-year-old male history of hypertension A. fib status post AICD hyperlipidemia CAD reports was recently at an outside hospital where he had his AICD battery changed presents with a chief complaint of pressure behind bilateral eyes, no vision changes no headache and no recent falls or head trauma.  Can move everything and feel everything.  No photophobia.  Noted triage note however patient reports his complaint is bilateral eye discomfort.  Patient also reported having palpitations chronically over the last few weeks and had mild episode of epigastric pain today.  Which is since resolved.  No nausea no vomiting no exertional chest pain or shortness of breath no diaphoresis.  No new lower extremity swelling or edema. No syncope. No firing from AICD.     All other ROS negative, except as above and as per HPI and ROS section.    VITALS: Initial triage and subsequent vitals have been reviewed by me.  GEN APPEARANCE: WDWN, alert, non-toxic, NAD  HEAD: Atraumatic.  EYES: PERRLa, EOMI, vision grossly intact.   NECK: Supple  CV: RRR, S1S2, no c/r/m/g. Cap refill <2 seconds. No bruits.   LUNGS: CTAB. No abnormal breath sounds.  ABDOMEN: Soft, NTND. No guarding or rebound.   MSK/EXT: No spinal or extremity point tenderness. No CVA ttp. Pelvis stable. No peripheral edema.  NEURO: Alert, follows commands. Weight bearing normal. Speech normal. Sensation and motor normal x4 extremities. CN2-12 normal, coordination normal, ambulating normally. UE & LE 5/5 b/l.  SKIN: Warm, dry and intact. No rash.  PSYCH: Appropriate    Plan/MDM: Mallika SMITH: 72-year-old male history of hypertension A. fib status post AICD hyperlipidemia CAD reports was recently at an outside hospital where he had his AICD battery changed presents with a chief complaint of pressure behind bilateral eyes, no vision changes no headache and no recent falls or head trauma.  Can move everything and feel everything.  No photophobia.  Noted triage note however patient reports his complaint is bilateral eye discomfort. exam vss non toxic well appearing vitals stable ddx unclear etiology of pts sx consider possible viral syndrome low c/f ich/sah as no trauma, exam now c/w cva or infection/orbital cellulitis, low c/f acs plan to check labs cxr cardiac enzymes, meds as needed, reassess, anticipate dc pending workup.

## 2022-12-18 NOTE — ED PROVIDER NOTE - OBJECTIVE STATEMENT
72-year-old male history of hypertension, A. fib status post AICD implant, HLD, CAD, Presents after feeling heart racing around 7 PM tonight.  Patient endorsed  denied any dizziness, chest pressure, shortness of breath during that time but due to his history was concerning came to the ED.  Patient states feeling disappeared approximately 20 minutes ago.  At this time denies any lightheadedness, chest pain, shortness of breath.  Endorses dull epigastric pain.  [facility] due to [facility]ICD firing and had battery replaced.  Patient denies feeling AICD fire this time. 72-year-old male history of hypertension, A. fib status post AICD implant, HLD, CAD, Presents after feeling heart racing around 7 PM tonight.  Patient endorsed pressure behind his eyes but denied any dizziness, chest pressure, shortness of breath, vision changes. Due to his medical history was concerned and came to the ED.  Patient states symptoms disappeared approximately 20 minutes ago before interview and now endorses dull epigastric pain.  At this time denies any lightheadedness, chest pain, shortness of breath.  [facility] and had battery replaced.  Patient denies feeling AICD fire this time.

## 2022-12-18 NOTE — ED PROVIDER NOTE - NSFOLLOWUPINSTRUCTIONS_ED_ALL_ED_FT
You were seen in the Emergency Department for palpitations and eye pressure. Your blood work, ekg, xrays, and CT scan did not show any emergent medical problems that require hospitalization or inpatient treatment. Follow up with your primary care doctor and cardiologist as discussed.     1) Continue all previously prescribed medications as directed.    2) Follow up with your primary care physician - take copies of your results.    3) Return to the Emergency Department for worsening or persistent symptoms, and/or ANY NEW OR CONCERNING SYMPTOMS.

## 2022-12-18 NOTE — ED PROVIDER NOTE - PATIENT PORTAL LINK FT
You can access the FollowMyHealth Patient Portal offered by [facility] by registering at the following website: [email or web address]. By joining [employer]’s FollowMyHealth portal, you will also be able to view your health information using other applications (apps) compatible with our system.

## 2022-12-18 NOTE — ED ADULT NURSE NOTE - NSIMPLEMENTINTERV_GEN_ALL_ED
Implemented All Universal Safety Interventions:  [place] to call system. Call bell, personal items and telephone within reach. Instruct patient to call for assistance. Room bathroom lighting operational. Non-slip footwear when patient is off stretcher. Physically safe environment: no spills, clutter or unnecessary equipment. Stretcher in lowest position, wheels locked, appropriate side rails in place.

## 2022-12-19 VITALS
RESPIRATION RATE: 16 BRPM | OXYGEN SATURATION: 100 % | SYSTOLIC BLOOD PRESSURE: 138 MMHG | TEMPERATURE: 98 F | DIASTOLIC BLOOD PRESSURE: 83 MMHG | HEART RATE: 60 BPM

## 2022-12-19 LAB — TROPONIN T, HIGH SENSITIVITY RESULT: 35 NG/L — SIGNIFICANT CHANGE UP

## 2022-12-21 NOTE — ED ADULT TRIAGE NOTE - MODE OF ARRIVAL
"                                              LOS: 1 day   Patient Care Team:  Adolfo Adame MD as PCP - General (Family Medicine)    Chief Complaint:  F/up subdural hemorrhage s/p craniotomy ICU care medical problems listed below.    Subjective   Interval History    Today, patient was confused and agitated.  He was not able to tell me his name.  According to the staff, he was trying to pull his Cardona catheter and his lines.  He was following commands occasionally.  He moves all his extremities.    No reports of seizure.    REVIEW OF SYSTEMS:   Cannot obtain due to confusion.    Ventilator/Non-Invasive Ventilation Settings (From admission, onward)    None                Physical Exam:     Vital Signs  Temp:  [97.4 °F (36.3 °C)-99 °F (37.2 °C)] 97.5 °F (36.4 °C)  Heart Rate:  [] 76  Resp:  [18] 18  BP: ()/() 133/68    Intake/Output Summary (Last 24 hours) at 12/21/2022 1116  Last data filed at 12/21/2022 0400  Gross per 24 hour   Intake 2411.25 ml   Output 495 ml   Net 1916.25 ml     Flowsheet Rows    Flowsheet Row First Filed Value   Admission Height 182.9 cm (72\") Documented at 12/20/2022 1147   Admission Weight 80.7 kg (178 lb) Documented at 12/20/2022 1147          PPE used per hospital policy    General Appearance:   Alert, occasionally cooperative, in no acute distress   ENMT:  Mallampati score 3, moist mucous membrane   Eyes:  Pupils equal and reactive to light. EOMI   Neck:   Trachea midline. No thyromegaly.   Lungs:    Clear to auscultation,respirations regular, even and nonlabored    Heart:   Regular rhythm and normal rate, normal S1 and S2, no         murmur   Skin:   No rash or ecchymosis   Abdomen:     Soft. No tenderness. No HSM.   Neuro/psych:  Conscious, alert, confused.  Intermittently cooperative.  Strength 5/5 in upper and lower  ext.    Extremities:  No cyanosis, clubbing or edema.  Warm extremities and well-perfused          Results Review:        Results from last 7 days "   Lab Units 12/21/22  0349 12/20/22  1214   SODIUM mmol/L 139 140   POTASSIUM mmol/L 3.7 4.6   CHLORIDE mmol/L 104 104   CO2 mmol/L 19.1* 24.6   BUN mg/dL 19 13   CREATININE mg/dL 0.97 0.88   GLUCOSE mg/dL 167* 156*   CALCIUM mg/dL 9.1 9.4     Results from last 7 days   Lab Units 12/20/22  1214   TROPONIN T ng/mL <0.010     Results from last 7 days   Lab Units 12/21/22  0349 12/20/22  1126   WBC 10*3/mm3 19.72* 14.23*   HEMOGLOBIN g/dL 12.4* 15.4   HEMATOCRIT % 36.5* 43.5   PLATELETS 10*3/mm3 258 214     Results from last 7 days   Lab Units 12/21/22  0349 12/20/22  1126   INR  1.11* 1.02   APTT seconds 26.2 23.2         I reviewed the patient's new clinical results.        Medication Review:   levETIRAcetam, 500 mg, Intravenous, Q12H  sodium chloride, 10 mL, Intravenous, Q12H        dexmedetomidine, 0.2-1.5 mcg/kg/hr, Last Rate: 0.5 mcg/kg/hr (12/21/22 0804)  niCARdipine, 5-15 mg/hr  niCARdipine, 5-15 mg/hr, Last Rate: Stopped (12/21/22 0620)  sodium chloride 0.45 % with KCl 20 mEq, 75 mL/hr, Last Rate: 75 mL/hr (12/21/22 0031)  sodium chloride, 100 mL/hr        Diagnostic imaging:  I personally and independently reviewed the following images:  CT head 12/21/22: Residual subdural hemorrhage.  Significant pneumocephalus.  Left-to-right midline shift, 8 mm    Assessment     1. Bilateral subdural hematoma, L>R.  Possibly traumatic secondary to anticoagulation, s/p craniotomy & evacuation 12/20  2. Brain swelling and midline shift  3. Encephalopathy, secondary to above  4. Stress-induced leukocytosis  5. History of stroke, on anticoagulation with Plavix  6. Hyperglycemia  7. Pulmonary infiltrates,?  Chronic scarring  8. Anemia, new on this admission.  No evidence of blood loss.    All problems new to me    Plan   · Nicardipine drip.  Titrate to keep SBP <150  · Precedex drip initiated due to agitation  · IV hydration with half NS and 20 shira KCl while n.p.o.  · Speech eval  · Keppra 500 mg twice daily for seizure  prophylaxis  · Follow-up hemoglobin  · Insulin NovoLog PRN per accu checks  · DVT prophylaxis with SCD          Dianne Kramer MD  12/21/22  11:16 EST      Time: Critical care 32 min      This note was dictated utilizing Dragon dictation   Walk in

## 2023-04-03 ENCOUNTER — EMERGENCY (EMERGENCY)
Facility: HOSPITAL | Age: 73
LOS: 0 days | Discharge: HOME | End: 2023-04-03
Attending: STUDENT IN AN ORGANIZED HEALTH CARE EDUCATION/TRAINING PROGRAM
Payer: COMMERCIAL

## 2023-04-03 VITALS
RESPIRATION RATE: 16 BRPM | TEMPERATURE: 98 F | SYSTOLIC BLOOD PRESSURE: 150 MMHG | WEIGHT: 205.03 LBS | HEART RATE: 71 BPM | OXYGEN SATURATION: 98 % | DIASTOLIC BLOOD PRESSURE: 78 MMHG

## 2023-04-03 VITALS
RESPIRATION RATE: 16 BRPM | TEMPERATURE: 98 F | HEART RATE: 59 BPM | SYSTOLIC BLOOD PRESSURE: 144 MMHG | OXYGEN SATURATION: 97 % | DIASTOLIC BLOOD PRESSURE: 80 MMHG

## 2023-04-03 DIAGNOSIS — R29.898 OTHER SYMPTOMS AND SIGNS INVOLVING THE MUSCULOSKELETAL SYSTEM: ICD-10-CM

## 2023-04-03 DIAGNOSIS — E78.5 HYPERLIPIDEMIA, UNSPECIFIED: ICD-10-CM

## 2023-04-03 DIAGNOSIS — I10 ESSENTIAL (PRIMARY) HYPERTENSION: ICD-10-CM

## 2023-04-03 DIAGNOSIS — I25.10 ATHEROSCLEROTIC HEART DISEASE OF NATIVE CORONARY ARTERY WITHOUT ANGINA PECTORIS: ICD-10-CM

## 2023-04-03 DIAGNOSIS — Z98.890 OTHER SPECIFIED POSTPROCEDURAL STATES: Chronic | ICD-10-CM

## 2023-04-03 DIAGNOSIS — Z79.01 LONG TERM (CURRENT) USE OF ANTICOAGULANTS: ICD-10-CM

## 2023-04-03 DIAGNOSIS — I48.91 UNSPECIFIED ATRIAL FIBRILLATION: ICD-10-CM

## 2023-04-03 DIAGNOSIS — Z95.0 PRESENCE OF CARDIAC PACEMAKER: ICD-10-CM

## 2023-04-03 DIAGNOSIS — Z95.5 PRESENCE OF CORONARY ANGIOPLASTY IMPLANT AND GRAFT: ICD-10-CM

## 2023-04-03 DIAGNOSIS — Z95.810 PRESENCE OF AUTOMATIC (IMPLANTABLE) CARDIAC DEFIBRILLATOR: Chronic | ICD-10-CM

## 2023-04-03 LAB
ALBUMIN SERPL ELPH-MCNC: 3.7 G/DL — SIGNIFICANT CHANGE UP (ref 3.3–5)
ALP SERPL-CCNC: 153 U/L — HIGH (ref 40–120)
ALT FLD-CCNC: 59 U/L — SIGNIFICANT CHANGE UP (ref 12–78)
ANION GAP SERPL CALC-SCNC: 3 MMOL/L — LOW (ref 5–17)
APTT BLD: 37.8 SEC — HIGH (ref 27.5–35.5)
AST SERPL-CCNC: 27 U/L — SIGNIFICANT CHANGE UP (ref 15–37)
BASOPHILS # BLD AUTO: 0 K/UL — SIGNIFICANT CHANGE UP (ref 0–0.2)
BASOPHILS NFR BLD AUTO: 0 % — SIGNIFICANT CHANGE UP (ref 0–2)
BILIRUB SERPL-MCNC: 0.6 MG/DL — SIGNIFICANT CHANGE UP (ref 0.2–1.2)
BUN SERPL-MCNC: 16 MG/DL — SIGNIFICANT CHANGE UP (ref 7–23)
CALCIUM SERPL-MCNC: 9.4 MG/DL — SIGNIFICANT CHANGE UP (ref 8.5–10.1)
CHLORIDE SERPL-SCNC: 109 MMOL/L — HIGH (ref 96–108)
CO2 SERPL-SCNC: 30 MMOL/L — SIGNIFICANT CHANGE UP (ref 22–31)
CREAT SERPL-MCNC: 1.47 MG/DL — HIGH (ref 0.5–1.3)
EGFR: 50 ML/MIN/1.73M2 — LOW
EOSINOPHIL # BLD AUTO: 0.2 K/UL — SIGNIFICANT CHANGE UP (ref 0–0.5)
EOSINOPHIL NFR BLD AUTO: 4 % — SIGNIFICANT CHANGE UP (ref 0–6)
GIANT PLATELETS BLD QL SMEAR: PRESENT — SIGNIFICANT CHANGE UP
GLUCOSE SERPL-MCNC: 83 MG/DL — SIGNIFICANT CHANGE UP (ref 70–99)
HCT VFR BLD CALC: 43.4 % — SIGNIFICANT CHANGE UP (ref 39–50)
HGB BLD-MCNC: 13.5 G/DL — SIGNIFICANT CHANGE UP (ref 13–17)
INR BLD: 1.5 RATIO — HIGH (ref 0.88–1.16)
LYMPHOCYTES # BLD AUTO: 1.1 K/UL — SIGNIFICANT CHANGE UP (ref 1–3.3)
LYMPHOCYTES # BLD AUTO: 22 % — SIGNIFICANT CHANGE UP (ref 13–44)
MANUAL SMEAR VERIFICATION: SIGNIFICANT CHANGE UP
MCHC RBC-ENTMCNC: 28.6 PG — SIGNIFICANT CHANGE UP (ref 27–34)
MCHC RBC-ENTMCNC: 31.1 G/DL — LOW (ref 32–36)
MCV RBC AUTO: 91.9 FL — SIGNIFICANT CHANGE UP (ref 80–100)
MONOCYTES # BLD AUTO: 0.4 K/UL — SIGNIFICANT CHANGE UP (ref 0–0.9)
MONOCYTES NFR BLD AUTO: 8 % — SIGNIFICANT CHANGE UP (ref 2–14)
NEUTROPHILS # BLD AUTO: 3.19 K/UL — SIGNIFICANT CHANGE UP (ref 1.8–7.4)
NEUTROPHILS NFR BLD AUTO: 64 % — SIGNIFICANT CHANGE UP (ref 43–77)
NRBC # BLD: 0 /100 — SIGNIFICANT CHANGE UP (ref 0–0)
NRBC # BLD: SIGNIFICANT CHANGE UP /100 WBCS (ref 0–0)
PLAT MORPH BLD: ABNORMAL
PLATELET # BLD AUTO: 167 K/UL — SIGNIFICANT CHANGE UP (ref 150–400)
POTASSIUM SERPL-MCNC: 4 MMOL/L — SIGNIFICANT CHANGE UP (ref 3.5–5.3)
POTASSIUM SERPL-SCNC: 4 MMOL/L — SIGNIFICANT CHANGE UP (ref 3.5–5.3)
PROT SERPL-MCNC: 7.1 GM/DL — SIGNIFICANT CHANGE UP (ref 6–8.3)
PROTHROM AB SERPL-ACNC: 18.1 SEC — HIGH (ref 10.5–13.4)
RBC # BLD: 4.72 M/UL — SIGNIFICANT CHANGE UP (ref 4.2–5.8)
RBC # FLD: 15.3 % — HIGH (ref 10.3–14.5)
RBC BLD AUTO: NORMAL — SIGNIFICANT CHANGE UP
SODIUM SERPL-SCNC: 142 MMOL/L — SIGNIFICANT CHANGE UP (ref 135–145)
VARIANT LYMPHS # BLD: 2 % — SIGNIFICANT CHANGE UP (ref 0–6)
WBC # BLD: 4.99 K/UL — SIGNIFICANT CHANGE UP (ref 3.8–10.5)
WBC # FLD AUTO: 4.99 K/UL — SIGNIFICANT CHANGE UP (ref 3.8–10.5)

## 2023-04-03 PROCEDURE — 93930 UPPER EXTREMITY STUDY: CPT | Mod: 26

## 2023-04-03 PROCEDURE — 93970 EXTREMITY STUDY: CPT | Mod: 26

## 2023-04-03 PROCEDURE — 93931 UPPER EXTREMITY STUDY: CPT | Mod: 26,59

## 2023-04-03 PROCEDURE — 99284 EMERGENCY DEPT VISIT MOD MDM: CPT

## 2023-04-03 NOTE — ED ADULT NURSE NOTE - CAS TRG GENERAL AIRWAY, MLM
10/31/22 0840   Provider Notification   Provider Name/Title Dr. Thorpe   Reason for Notification Maternal vital sign change   Method of Notification Electronic Page   Request Evaluate - Remote   Response See orders     Dr. Thorpe notified of 2 severe range Bps. Patient anxious d/t procedure. Labs ordered.    Patent

## 2023-04-03 NOTE — ED ADULT NURSE NOTE - OBJECTIVE STATEMENT
patient alert and oriented x4, came in for cold arms and warm legs. pt states for the past week he's had cold hands and warm legs, his cardiologist informed him to go to the ER for any discomfort. pt denies pain at the moment. hx of HTN and afib with implanted defibrillator, pt states it shocked about 4 months ago. upon assessment pt in no acute respiratory distress, denies any chest pain, SOB, hands are cool to touch and legs are warm to touch with full mobility and sensation, denies numbness or tingling.

## 2023-04-03 NOTE — ED PROVIDER NOTE - CLINICAL SUMMARY MEDICAL DECISION MAKING FREE TEXT BOX
73M PMH CAD, Afib, ICD/PPM, on AC / AP, pw 1-2wks UE cool, LE warm. AF, VSS. Well appearing, in NAD. BUE / BLE NVI. Plan for labs, US. Re-eval. 73M PMH CAD, Afib, ICD/PPM - on AC / AP pw 1-2wks BUE cool, BLE warm. AF, VSS. Well appearing, in NAD. BUE / BLE NVI. Plan for labs, US. Re-eval.   W/u w/o significant abnormalities. On re-eval, pt resting comfortably, in NAD. Stable for d/c home. Instructed for close outpatient Cardio, PCP f/u - Pt reports w/ f/u appts scheduled for later this week. Return signs / symptoms d/w pt. He understands / agrees w/ this plan.

## 2023-04-03 NOTE — ED PROVIDER NOTE - OBJECTIVE STATEMENT
73M PMH Afib - on Xarelto, CAD s/p PCI, ICD / PPM - on Plavix, HTN, HLD pw 1-2wks intermittent BUE feeling cold and BLE feeling warm, worsening. ROS otherwise negative.

## 2023-04-03 NOTE — ED PROVIDER NOTE - PATIENT PORTAL LINK FT
You can access the FollowMyHealth Patient Portal offered by [facility] by registering at the following website: [email or web address]. By joining [employer]’s FollowMyHealth portal, you will also be able to view your health information using other applications (apps) compatible with our system. You can access the FollowMyHealth Patient Portal offered by [facility] by registering at the following website: [email or web address]. By joining [employer]’s FollowMyHealth portal, you will also be able to view your health information using other applications (apps) compatible with our system. You can access the FollowMyHealth Patient Portal offered by [facility] by registering at the following website: [email or web address]. By joining [employer]’s FollowMyHealth portal, you will also be able to view your health information using other applications (apps) compatible with our system.

## 2023-04-03 NOTE — ED ADULT TRIAGE NOTE - CHIEF COMPLAINT QUOTE
BIBA for cold arms and warm legs started at 11am today. Hx of HTN, afib with implanted defibrillator. On xarelto and clopidogrel. Denies weakness, n/v or fever.

## 2023-04-03 NOTE — ED ADULT NURSE NOTE - CAS EDP DISCH TYPE
Painful - The patient does not respond to voice, but does respond to a painful stimulus, such as a squeeze to the hand or sternal rub. A noxious stimulous is needed to elicit a response. Home

## 2023-04-03 NOTE — ED ADULT TRIAGE NOTE - NS ED NURSE AMBULANCES
[facility], Ambulance Department [facility], Ambulance Department [facility], Ambulance Department

## 2023-04-03 NOTE — ED ADULT NURSE NOTE - NSIMPLEMENTINTERV_GEN_ALL_ED
Implemented All Universal Safety Interventions:  [place] to call system. Call bell, personal items and telephone within reach. Instruct patient to call for assistance. Room bathroom lighting operational. Non-slip footwear when patient is off stretcher. Physically safe environment: no spills, clutter or unnecessary equipment. Stretcher in lowest position, wheels locked, appropriate side rails in place. Implemented All Universal Safety Interventions:  [place] to call system. Call bell, personal items and telephone within reach. Instruct patient to call for assistance. Room bathroom lighting operational. Non-slip footwear when patient is off stretcher. Physically safe environment: no spills, clutter or unnecessary equipment. Stretcher in lowest position, wheels locked, appropriate side rails in place. Implemented All Universal Safety Interventions:  [place] to call system. Call bell, personal items and telephone within reach. Instruct patient to call for assistance. Room bathroom lighting operational. Non-slip footwear when patient is off stretcher. Physically safe environment: no spills, clutter or unnecessary equipment. Stretcher in lowest position, wheels locked, appropriate side rails in place.

## 2023-04-03 NOTE — ED PROVIDER NOTE - PHYSICAL EXAMINATION
GEN: Awake, alert, interactive, NAD.  HEAD AND NECK: NC/AT. Airway patent. Neck supple.   EYES:  Clear b/l. EOMI. PERRL.   ENT: Moist mucus membranes.   CARDIAC: Regular rate, regular rhythm. No evident pedal edema.    RESP/CHEST: Normal respiratory effort with no use of accessory muscles or retractions. Clear throughout on auscultation.  ABD: Soft, non-distended, non-tender. No rebound, no guarding.   BACK: No midline spinal TTP. No CVAT.   EXTREMITIES: BUE / BLE NVI, w/o overlying erythema / edema / tenderness.   SKIN: Warm, dry, intact normal color. No rash.   NEURO: AOx3, CN II-XII grossly intact, no focal deficits.   PSYCH: Appropriate mood and affect.

## 2023-12-08 ENCOUNTER — INPATIENT (INPATIENT)
Facility: HOSPITAL | Age: 73
LOS: 3 days | Discharge: HOME | End: 2023-12-12
Attending: INTERNAL MEDICINE | Admitting: INTERNAL MEDICINE
Payer: COMMERCIAL

## 2023-12-08 VITALS
RESPIRATION RATE: 16 BRPM | SYSTOLIC BLOOD PRESSURE: 108 MMHG | DIASTOLIC BLOOD PRESSURE: 48 MMHG | OXYGEN SATURATION: 100 % | TEMPERATURE: 99 F | HEART RATE: 58 BPM

## 2023-12-08 DIAGNOSIS — K61.1 RECTAL ABSCESS: ICD-10-CM

## 2023-12-08 DIAGNOSIS — Z95.810 PRESENCE OF AUTOMATIC (IMPLANTABLE) CARDIAC DEFIBRILLATOR: Chronic | ICD-10-CM

## 2023-12-08 DIAGNOSIS — Z98.890 OTHER SPECIFIED POSTPROCEDURAL STATES: Chronic | ICD-10-CM

## 2023-12-08 LAB
ALBUMIN SERPL ELPH-MCNC: 3.8 G/DL — SIGNIFICANT CHANGE UP (ref 3.3–5)
ALBUMIN SERPL ELPH-MCNC: 3.8 G/DL — SIGNIFICANT CHANGE UP (ref 3.3–5)
ALP SERPL-CCNC: 109 U/L — SIGNIFICANT CHANGE UP (ref 40–120)
ALP SERPL-CCNC: 109 U/L — SIGNIFICANT CHANGE UP (ref 40–120)
ALT FLD-CCNC: 24 U/L — SIGNIFICANT CHANGE UP (ref 4–41)
ALT FLD-CCNC: 24 U/L — SIGNIFICANT CHANGE UP (ref 4–41)
ANION GAP SERPL CALC-SCNC: 13 MMOL/L — SIGNIFICANT CHANGE UP (ref 7–14)
ANION GAP SERPL CALC-SCNC: 13 MMOL/L — SIGNIFICANT CHANGE UP (ref 7–14)
APTT BLD: 40.7 SEC — HIGH (ref 24.5–35.6)
APTT BLD: 40.7 SEC — HIGH (ref 24.5–35.6)
AST SERPL-CCNC: 38 U/L — SIGNIFICANT CHANGE UP (ref 4–40)
AST SERPL-CCNC: 38 U/L — SIGNIFICANT CHANGE UP (ref 4–40)
BASE EXCESS BLDV CALC-SCNC: 3.7 MMOL/L — HIGH (ref -2–3)
BASE EXCESS BLDV CALC-SCNC: 3.7 MMOL/L — HIGH (ref -2–3)
BASOPHILS # BLD AUTO: 0.03 K/UL — SIGNIFICANT CHANGE UP (ref 0–0.2)
BASOPHILS # BLD AUTO: 0.03 K/UL — SIGNIFICANT CHANGE UP (ref 0–0.2)
BASOPHILS NFR BLD AUTO: 0.2 % — SIGNIFICANT CHANGE UP (ref 0–2)
BASOPHILS NFR BLD AUTO: 0.2 % — SIGNIFICANT CHANGE UP (ref 0–2)
BILIRUB SERPL-MCNC: 0.7 MG/DL — SIGNIFICANT CHANGE UP (ref 0.2–1.2)
BILIRUB SERPL-MCNC: 0.7 MG/DL — SIGNIFICANT CHANGE UP (ref 0.2–1.2)
BLD GP AB SCN SERPL QL: NEGATIVE — SIGNIFICANT CHANGE UP
BLD GP AB SCN SERPL QL: NEGATIVE — SIGNIFICANT CHANGE UP
BLOOD GAS VENOUS COMPREHENSIVE RESULT: SIGNIFICANT CHANGE UP
BLOOD GAS VENOUS COMPREHENSIVE RESULT: SIGNIFICANT CHANGE UP
BUN SERPL-MCNC: 21 MG/DL — SIGNIFICANT CHANGE UP (ref 7–23)
BUN SERPL-MCNC: 21 MG/DL — SIGNIFICANT CHANGE UP (ref 7–23)
CALCIUM SERPL-MCNC: 9.4 MG/DL — SIGNIFICANT CHANGE UP (ref 8.4–10.5)
CALCIUM SERPL-MCNC: 9.4 MG/DL — SIGNIFICANT CHANGE UP (ref 8.4–10.5)
CHLORIDE BLDV-SCNC: 101 MMOL/L — SIGNIFICANT CHANGE UP (ref 96–108)
CHLORIDE BLDV-SCNC: 101 MMOL/L — SIGNIFICANT CHANGE UP (ref 96–108)
CHLORIDE SERPL-SCNC: 102 MMOL/L — SIGNIFICANT CHANGE UP (ref 98–107)
CHLORIDE SERPL-SCNC: 102 MMOL/L — SIGNIFICANT CHANGE UP (ref 98–107)
CO2 BLDV-SCNC: 33.6 MMOL/L — HIGH (ref 22–26)
CO2 BLDV-SCNC: 33.6 MMOL/L — HIGH (ref 22–26)
CO2 SERPL-SCNC: 22 MMOL/L — SIGNIFICANT CHANGE UP (ref 22–31)
CO2 SERPL-SCNC: 22 MMOL/L — SIGNIFICANT CHANGE UP (ref 22–31)
CREAT SERPL-MCNC: 1.66 MG/DL — HIGH (ref 0.5–1.3)
CREAT SERPL-MCNC: 1.66 MG/DL — HIGH (ref 0.5–1.3)
EGFR: 44 ML/MIN/1.73M2 — LOW
EGFR: 44 ML/MIN/1.73M2 — LOW
EOSINOPHIL # BLD AUTO: 0.05 K/UL — SIGNIFICANT CHANGE UP (ref 0–0.5)
EOSINOPHIL # BLD AUTO: 0.05 K/UL — SIGNIFICANT CHANGE UP (ref 0–0.5)
EOSINOPHIL NFR BLD AUTO: 0.4 % — SIGNIFICANT CHANGE UP (ref 0–6)
EOSINOPHIL NFR BLD AUTO: 0.4 % — SIGNIFICANT CHANGE UP (ref 0–6)
GAS PNL BLDV: 128 MMOL/L — LOW (ref 136–145)
GAS PNL BLDV: 128 MMOL/L — LOW (ref 136–145)
GLUCOSE BLDV-MCNC: 94 MG/DL — SIGNIFICANT CHANGE UP (ref 70–99)
GLUCOSE BLDV-MCNC: 94 MG/DL — SIGNIFICANT CHANGE UP (ref 70–99)
GLUCOSE SERPL-MCNC: 94 MG/DL — SIGNIFICANT CHANGE UP (ref 70–99)
GLUCOSE SERPL-MCNC: 94 MG/DL — SIGNIFICANT CHANGE UP (ref 70–99)
HCO3 BLDV-SCNC: 32 MMOL/L — HIGH (ref 22–29)
HCO3 BLDV-SCNC: 32 MMOL/L — HIGH (ref 22–29)
HCT VFR BLD CALC: 40.8 % — SIGNIFICANT CHANGE UP (ref 39–50)
HCT VFR BLD CALC: 40.8 % — SIGNIFICANT CHANGE UP (ref 39–50)
HCT VFR BLDA CALC: 40 % — SIGNIFICANT CHANGE UP (ref 39–51)
HCT VFR BLDA CALC: 40 % — SIGNIFICANT CHANGE UP (ref 39–51)
HEMOLYSIS INDEX: 129 — SIGNIFICANT CHANGE UP
HEMOLYSIS INDEX: 129 — SIGNIFICANT CHANGE UP
HGB BLD CALC-MCNC: 13.3 G/DL — SIGNIFICANT CHANGE UP (ref 12.6–17.4)
HGB BLD CALC-MCNC: 13.3 G/DL — SIGNIFICANT CHANGE UP (ref 12.6–17.4)
HGB BLD-MCNC: 13.2 G/DL — SIGNIFICANT CHANGE UP (ref 13–17)
HGB BLD-MCNC: 13.2 G/DL — SIGNIFICANT CHANGE UP (ref 13–17)
IANC: 9.56 K/UL — HIGH (ref 1.8–7.4)
IANC: 9.56 K/UL — HIGH (ref 1.8–7.4)
IMM GRANULOCYTES NFR BLD AUTO: 0.3 % — SIGNIFICANT CHANGE UP (ref 0–0.9)
IMM GRANULOCYTES NFR BLD AUTO: 0.3 % — SIGNIFICANT CHANGE UP (ref 0–0.9)
INR BLD: 1.45 RATIO — HIGH (ref 0.85–1.18)
INR BLD: 1.45 RATIO — HIGH (ref 0.85–1.18)
LACTATE BLDV-MCNC: 2 MMOL/L — SIGNIFICANT CHANGE UP (ref 0.5–2)
LACTATE BLDV-MCNC: 2 MMOL/L — SIGNIFICANT CHANGE UP (ref 0.5–2)
LYMPHOCYTES # BLD AUTO: 0.95 K/UL — LOW (ref 1–3.3)
LYMPHOCYTES # BLD AUTO: 0.95 K/UL — LOW (ref 1–3.3)
LYMPHOCYTES # BLD AUTO: 7.8 % — LOW (ref 13–44)
LYMPHOCYTES # BLD AUTO: 7.8 % — LOW (ref 13–44)
MCHC RBC-ENTMCNC: 29.4 PG — SIGNIFICANT CHANGE UP (ref 27–34)
MCHC RBC-ENTMCNC: 29.4 PG — SIGNIFICANT CHANGE UP (ref 27–34)
MCHC RBC-ENTMCNC: 32.4 GM/DL — SIGNIFICANT CHANGE UP (ref 32–36)
MCHC RBC-ENTMCNC: 32.4 GM/DL — SIGNIFICANT CHANGE UP (ref 32–36)
MCV RBC AUTO: 90.9 FL — SIGNIFICANT CHANGE UP (ref 80–100)
MCV RBC AUTO: 90.9 FL — SIGNIFICANT CHANGE UP (ref 80–100)
MONOCYTES # BLD AUTO: 1.6 K/UL — HIGH (ref 0–0.9)
MONOCYTES # BLD AUTO: 1.6 K/UL — HIGH (ref 0–0.9)
MONOCYTES NFR BLD AUTO: 13.1 % — SIGNIFICANT CHANGE UP (ref 2–14)
MONOCYTES NFR BLD AUTO: 13.1 % — SIGNIFICANT CHANGE UP (ref 2–14)
NEUTROPHILS # BLD AUTO: 9.56 K/UL — HIGH (ref 1.8–7.4)
NEUTROPHILS # BLD AUTO: 9.56 K/UL — HIGH (ref 1.8–7.4)
NEUTROPHILS NFR BLD AUTO: 78.2 % — HIGH (ref 43–77)
NEUTROPHILS NFR BLD AUTO: 78.2 % — HIGH (ref 43–77)
NRBC # BLD: 0 /100 WBCS — SIGNIFICANT CHANGE UP (ref 0–0)
NRBC # BLD: 0 /100 WBCS — SIGNIFICANT CHANGE UP (ref 0–0)
NRBC # FLD: 0 K/UL — SIGNIFICANT CHANGE UP (ref 0–0)
NRBC # FLD: 0 K/UL — SIGNIFICANT CHANGE UP (ref 0–0)
PCO2 BLDV: 63 MMHG — HIGH (ref 42–55)
PCO2 BLDV: 63 MMHG — HIGH (ref 42–55)
PH BLDV: 7.31 — LOW (ref 7.32–7.43)
PH BLDV: 7.31 — LOW (ref 7.32–7.43)
PLATELET # BLD AUTO: 180 K/UL — SIGNIFICANT CHANGE UP (ref 150–400)
PLATELET # BLD AUTO: 180 K/UL — SIGNIFICANT CHANGE UP (ref 150–400)
PO2 BLDV: 30 MMHG — SIGNIFICANT CHANGE UP (ref 25–45)
PO2 BLDV: 30 MMHG — SIGNIFICANT CHANGE UP (ref 25–45)
POTASSIUM BLDV-SCNC: >11 MMOL/L — CRITICAL HIGH (ref 3.5–5.1)
POTASSIUM BLDV-SCNC: >11 MMOL/L — CRITICAL HIGH (ref 3.5–5.1)
POTASSIUM SERPL-MCNC: 4.9 MMOL/L — SIGNIFICANT CHANGE UP (ref 3.5–5.3)
POTASSIUM SERPL-MCNC: 4.9 MMOL/L — SIGNIFICANT CHANGE UP (ref 3.5–5.3)
POTASSIUM SERPL-MCNC: 5.6 MMOL/L — HIGH (ref 3.5–5.3)
POTASSIUM SERPL-MCNC: 5.6 MMOL/L — HIGH (ref 3.5–5.3)
POTASSIUM SERPL-SCNC: 4.9 MMOL/L — SIGNIFICANT CHANGE UP (ref 3.5–5.3)
POTASSIUM SERPL-SCNC: 4.9 MMOL/L — SIGNIFICANT CHANGE UP (ref 3.5–5.3)
POTASSIUM SERPL-SCNC: 5.6 MMOL/L — HIGH (ref 3.5–5.3)
POTASSIUM SERPL-SCNC: 5.6 MMOL/L — HIGH (ref 3.5–5.3)
PROT SERPL-MCNC: 7.5 G/DL — SIGNIFICANT CHANGE UP (ref 6–8.3)
PROT SERPL-MCNC: 7.5 G/DL — SIGNIFICANT CHANGE UP (ref 6–8.3)
PROTHROM AB SERPL-ACNC: 16.1 SEC — HIGH (ref 9.5–13)
PROTHROM AB SERPL-ACNC: 16.1 SEC — HIGH (ref 9.5–13)
RBC # BLD: 4.49 M/UL — SIGNIFICANT CHANGE UP (ref 4.2–5.8)
RBC # BLD: 4.49 M/UL — SIGNIFICANT CHANGE UP (ref 4.2–5.8)
RBC # FLD: 15 % — HIGH (ref 10.3–14.5)
RBC # FLD: 15 % — HIGH (ref 10.3–14.5)
RH IG SCN BLD-IMP: POSITIVE — SIGNIFICANT CHANGE UP
RH IG SCN BLD-IMP: POSITIVE — SIGNIFICANT CHANGE UP
SAO2 % BLDV: 37.1 % — LOW (ref 67–88)
SAO2 % BLDV: 37.1 % — LOW (ref 67–88)
SODIUM SERPL-SCNC: 137 MMOL/L — SIGNIFICANT CHANGE UP (ref 135–145)
SODIUM SERPL-SCNC: 137 MMOL/L — SIGNIFICANT CHANGE UP (ref 135–145)
WBC # BLD: 12.23 K/UL — HIGH (ref 3.8–10.5)
WBC # BLD: 12.23 K/UL — HIGH (ref 3.8–10.5)
WBC # FLD AUTO: 12.23 K/UL — HIGH (ref 3.8–10.5)
WBC # FLD AUTO: 12.23 K/UL — HIGH (ref 3.8–10.5)

## 2023-12-08 PROCEDURE — 93010 ELECTROCARDIOGRAM REPORT: CPT

## 2023-12-08 PROCEDURE — 99285 EMERGENCY DEPT VISIT HI MDM: CPT

## 2023-12-08 PROCEDURE — 74177 CT ABD & PELVIS W/CONTRAST: CPT | Mod: 26,MA

## 2023-12-08 RX ORDER — METRONIDAZOLE 500 MG
TABLET ORAL
Refills: 0 | Status: DISCONTINUED | OUTPATIENT
Start: 2023-12-08 | End: 2023-12-12

## 2023-12-08 RX ORDER — METRONIDAZOLE 500 MG
500 TABLET ORAL ONCE
Refills: 0 | Status: COMPLETED | OUTPATIENT
Start: 2023-12-08 | End: 2023-12-08

## 2023-12-08 RX ORDER — METRONIDAZOLE 500 MG
500 TABLET ORAL EVERY 8 HOURS
Refills: 0 | Status: DISCONTINUED | OUTPATIENT
Start: 2023-12-09 | End: 2023-12-12

## 2023-12-08 RX ORDER — ACETAMINOPHEN 500 MG
650 TABLET ORAL EVERY 6 HOURS
Refills: 0 | Status: DISCONTINUED | OUTPATIENT
Start: 2023-12-08 | End: 2023-12-12

## 2023-12-08 RX ORDER — SODIUM CHLORIDE 9 MG/ML
1000 INJECTION INTRAMUSCULAR; INTRAVENOUS; SUBCUTANEOUS ONCE
Refills: 0 | Status: COMPLETED | OUTPATIENT
Start: 2023-12-08 | End: 2023-12-08

## 2023-12-08 RX ORDER — CEFTRIAXONE 500 MG/1
1000 INJECTION, POWDER, FOR SOLUTION INTRAMUSCULAR; INTRAVENOUS EVERY 24 HOURS
Refills: 0 | Status: DISCONTINUED | OUTPATIENT
Start: 2023-12-08 | End: 2023-12-12

## 2023-12-08 RX ORDER — PIPERACILLIN AND TAZOBACTAM 4; .5 G/20ML; G/20ML
3.38 INJECTION, POWDER, LYOPHILIZED, FOR SOLUTION INTRAVENOUS ONCE
Refills: 0 | Status: COMPLETED | OUTPATIENT
Start: 2023-12-08 | End: 2023-12-08

## 2023-12-08 RX ADMIN — SODIUM CHLORIDE 1000 MILLILITER(S): 9 INJECTION INTRAMUSCULAR; INTRAVENOUS; SUBCUTANEOUS at 15:05

## 2023-12-08 RX ADMIN — PIPERACILLIN AND TAZOBACTAM 200 GRAM(S): 4; .5 INJECTION, POWDER, LYOPHILIZED, FOR SOLUTION INTRAVENOUS at 18:32

## 2023-12-08 RX ADMIN — CEFTRIAXONE 100 MILLIGRAM(S): 500 INJECTION, POWDER, FOR SOLUTION INTRAMUSCULAR; INTRAVENOUS at 23:55

## 2023-12-08 RX ADMIN — Medication 100 MILLIGRAM(S): at 23:55

## 2023-12-08 NOTE — CONSULT NOTE ADULT - ASSESSMENT
A 73 year old male with PMHx of  CHF. pacemaker, AFIB on xarelto presents with BRBPR. Patient is HD stable with Hgb 13.2. There are no signs of active bleeding.    Recommendations:  - Would recommend GI consult.  - Follow up outpatient with colorectal surgery for possible perianal fistula - Dr. Resendiz  - Would admit to medicine for further workup  - Would consult cardiology for further workup and recommendations    Plans discussed with the fellow on behalf of the attending    A-Team  [id number] A 73 year old male with PMHx of  CHF. pacemaker, AFIB on xarelto presents with BRBPR. Patient is HD stable with Hgb 13.2. There are no signs of active bleeding.    Recommendations:  - Would recommend GI consult.  - Follow up outpatient with colorectal surgery for possible perianal fistula - Dr. Resendiz  - Would admit to medicine for further workup  - Would consult cardiology for further workup and recommendations    Plans discussed with the fellow on behalf of the attending    A-Team  [id number] A 73 year old male with PMHx of  CHF. pacemaker, AFIB on xarelto (last dose 12/07) presents with BRBPR. Patient is HD stable with Hgb 13.2. There are no signs of active bleeding.    Recommendations:  - Would recommend GI consult.  - Follow up outpatient with colorectal surgery for possible perianal fistula - Dr. Resendiz  - Would admit to medicine for further workup  - Would consult cardiology for further workup and recommendations    Plans discussed with the fellow on behalf of the attending    A-Team  [id number] A 73 year old male with PMHx of  CHF. pacemaker, AFIB on xarelto (last dose 12/07) presents with BRBPR. Patient is HD stable with Hgb 13.2. There are no signs of active bleeding.    Recommendations:  - Would recommend GI consult.  - Follow up outpatient with colorectal surgery for possible perianal fistula - Dr. Resendiz  - Would admit to medicine for further workup  - Would consult cardiology for further workup and recommendations    Plans discussed with the fellow on behalf of the attending    A-Team  [id number]

## 2023-12-08 NOTE — CONSULT NOTE ADULT - SUBJECTIVE AND OBJECTIVE BOX
General Surgery Consult Note  Attending: Astrid  Service: A-Team  [id number]      HPI:  A 73 year with PMHx of CHF, Afib on xarelto last dose 2023. Patients presents to the ED for BRBPR for started today. The blood is mixed with the stool and not associated with pain or itching. Patient denies any changes in bowel habits. No history of hemorrhoids reported by the patient. His last colonoscopy was 20-years ago which was normal per the patient. Otherwise he had cologuard done 2-years ago. Patient is adamantly refusing colonoscopy as it is contraindicated per his cardiologist ??. Patient otherwise denies abdominal pain, nausea, vomiting, fever, or chills, or SOB.     PAST MEDICAL HISTORY:  PAST MEDICAL & SURGICAL HISTORY:  Hypertension      CAD (coronary artery disease)      Hyperlipidemia      Congestive heart failure (CHF)      Atrial fibrillation      History of cardiac cath  with 3 stents      AICD (automatic cardioverter/defibrillator) present  [employer]          ALLERGIES:  Allergies    No Known Allergies    Intolerances        SOCIAL HISTORY:    FAMILY HISTORY:  FAMILY HISTORY:  Family history of pancreatic cancer  Father  from pancreatic cancer    Family history of heart disease in brother      PHYSICAL EXAM:  General: NAD, resting comfortably  Neuro: AOx3  Psych: Normal affect  Pulmonary: non-labored breathing  Abdominal: soft, ND/NT. There is blood tinged fluid around the anal canal. IRENE did not show any blood in the rectal vault. Normal rectal tone.    VITAL SIGNS:  Vital Signs Last 24 Hrs  T(C): 37.4 (08 Dec 2023 17:16), Max: 37.4 (08 Dec 2023 17:16)  T(F): 99.4 (08 Dec 2023 17:16), Max: 99.4 (08 Dec 2023 17:16)  HR: 64 (08 Dec 2023 17:16) (58 - 64)  BP: 119/61 (08 Dec 2023 17:16) (108/48 - 119/61)  BP(mean): --  RR: 18 (08 Dec 2023 17:16) (16 - 18)  SpO2: 97% (08 Dec 2023 17:16) (97% - 100%)    Parameters below as of 08 Dec 2023 17:16  Patient On (Oxygen Delivery Method): room air        I&O's Summary      LABS:                        13.2   12.23 )-----------( 180      ( 08 Dec 2023 13:51 )             40.8     12    137  |  102  |  21  ----------------------------<  94  5.6<H>   |  22  |  1.66<H>    Ca    9.4      08 Dec 2023 13:51    TPro  7.5  /  Alb  3.8  /  TBili  0.7  /  DBili  x   /  AST  38  /  ALT  24  /  AlkPhos  109  12-08    PT/INR - ( 08 Dec 2023 13:51 )   PT: 16.1 sec;   INR: 1.45 ratio         PTT - ( 08 Dec 2023 13:51 )  PTT:40.7 sec  Urinalysis Basic - ( 08 Dec 2023 13:51 )    Color: x / Appearance: x / SG: x / pH: x  Gluc: 94 mg/dL / Ketone: x  / Bili: x / Urobili: x   Blood: x / Protein: x / Nitrite: x   Leuk Esterase: x / RBC: x / WBC x   Sq Epi: x / Non Sq Epi: x / Bacteria: x      CAPILLARY BLOOD GLUCOSE      LIVER FUNCTIONS - ( 08 Dec 2023 13:51 )  Alb: 3.8 g/dL / Pro: 7.5 g/dL / ALK PHOS: 109 U/L / ALT: 24 U/L / AST: 38 U/L / GGT: x                    General Surgery Consult Note  Attending: Astrid  Service: A-Team  [id number]      HPI:  A 73 year with PMHx of CHF, Afib on xarelto last dose 2023. Patients presents to the ED for BRBPR for started today. The blood is mixed with the stool and not associated with pain or itching. Patient denies any changes in bowel habits. No history of hemorrhoids reported by the patient. His last colonoscopy was 20-years ago which was normal per the patient. Otherwise he had cologuard done 2-years ago. Patient is adamantly refusing colonoscopy as it is contraindicated per his cardiologist ??. Patient otherwise denies abdominal pain, nausea, vomiting, fever, or chills, or SOB.     PAST MEDICAL HISTORY:  PAST MEDICAL & SURGICAL HISTORY:  Hypertension      CAD (coronary artery disease)      Hyperlipidemia      Congestive heart failure (CHF)      Atrial fibrillation      History of cardiac cath  with 3 stents      AICD (automatic cardioverter/defibrillator) present  [employer]          ALLERGIES:  Allergies    No Known Allergies    Intolerances        SOCIAL HISTORY:    FAMILY HISTORY:  FAMILY HISTORY:  Family history of pancreatic cancer  Father  from pancreatic cancer    Family history of heart disease in brother      PHYSICAL EXAM:  General: NAD, resting comfortably  Neuro: AOx3  Psych: Normal affect  Pulmonary: non-labored breathing  Abdominal: soft, ND/NT. There is blood tinged fluid around the anal canal. IRENE did not show any blood in the rectal vault. Normal rectal tone.    VITAL SIGNS:  Vital Signs Last 24 Hrs  T(C): 37.4 (08 Dec 2023 17:16), Max: 37.4 (08 Dec 2023 17:16)  T(F): 99.4 (08 Dec 2023 17:16), Max: 99.4 (08 Dec 2023 17:16)  HR: 64 (08 Dec 2023 17:16) (58 - 64)  BP: 119/61 (08 Dec 2023 17:16) (108/48 - 119/61)  BP(mean): --  RR: 18 (08 Dec 2023 17:16) (16 - 18)  SpO2: 97% (08 Dec 2023 17:16) (97% - 100%)    Parameters below as of 08 Dec 2023 17:16  Patient On (Oxygen Delivery Method): room air        I&O's Summary      LABS:                        13.2   12.23 )-----------( 180      ( 08 Dec 2023 13:51 )             40.8     12    137  |  102  |  21  ----------------------------<  94  5.6<H>   |  22  |  1.66<H>    Ca    9.4      08 Dec 2023 13:51    TPro  7.5  /  Alb  3.8  /  TBili  0.7  /  DBili  x   /  AST  38  /  ALT  24  /  AlkPhos  109  12-08    PT/INR - ( 08 Dec 2023 13:51 )   PT: 16.1 sec;   INR: 1.45 ratio         PTT - ( 08 Dec 2023 13:51 )  PTT:40.7 sec  Urinalysis Basic - ( 08 Dec 2023 13:51 )    Color: x / Appearance: x / SG: x / pH: x  Gluc: 94 mg/dL / Ketone: x  / Bili: x / Urobili: x   Blood: x / Protein: x / Nitrite: x   Leuk Esterase: x / RBC: x / WBC x   Sq Epi: x / Non Sq Epi: x / Bacteria: x      CAPILLARY BLOOD GLUCOSE      LIVER FUNCTIONS - ( 08 Dec 2023 13:51 )  Alb: 3.8 g/dL / Pro: 7.5 g/dL / ALK PHOS: 109 U/L / ALT: 24 U/L / AST: 38 U/L / GGT: x

## 2023-12-08 NOTE — ED PROVIDER NOTE - ATTENDING CONTRIBUTION TO CARE
Dr. Novak:  I have personally performed a face to face bedside history and physical examination of this patient. I have discussed the history, examination, review of systems, assessment and plan of management with the resident. I have reviewed the electronic medical record and amended it to reflect my history, review of systems, physical exam, assessment and plan.    73M h/o hypertension, A. fib status post AICD implant on Xarelto, HLD, CAD, presents with BRBPR.  Over past few days, felt a "cyst" at anus, which spontaneously ruptured and now oozing blood.  ROS otherwise negative.    Exam:  - nad  - rrr  - ctab  - abd soft ntnd  - +active oozing blood on exam from ruptured lesion at anus, +fluctuance around the area    A/P  - bleeding from ruptured lesion, eval for perirectal abscess/fistual  - basic labs, coags, CT pelvis

## 2023-12-08 NOTE — ED ADULT TRIAGE NOTE - CHIEF COMPLAINT QUOTE
Pt c/o blood in the stool, bleeding after bowel movement today. Pt reports feeling lump on the rectum. Denies abdominal pain, dizziness. Pt takes Xarelto for A. Fib.

## 2023-12-08 NOTE — ED PROVIDER NOTE - OBJECTIVE STATEMENT
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  Patient stated that 3 days ago he noticed a lump by his rectum, and last night his wife evaluated it noted that was red and he eats almond sized.  She tried to pop it, but nothing came out.  This morning patient noted that we went to the bathroom he noticed a lot of blood in the toilet.  Patient states he still feels the mass, but is not painful.  He denies any abdominal pain, nausea, vomiting, diarrhea.  He states that his stools are light brown in coloration.  Patient denies fevers, chills, body aches.

## 2023-12-08 NOTE — ED ADULT NURSE NOTE - NSFALLRISKINTERV_ED_ALL_ED
Assistance OOB with selected safe patient handling equipment if applicable/Assistance with ambulation/Communicate fall risk and risk factors to all staff, patient, and family/Monitor gait and stability/Provide patient with walking aids/Provide visual cue: yellow wristband, yellow gown, etc/Reinforce activity limits and safety measures with patient and family/Call bell, personal items and telephone in reach/Instruct patient to call for assistance before getting out of bed/chair/stretcher/Non-slip footwear applied when patient is off stretcher/[place] to call system/Physically safe environment - no spills, clutter or unnecessary equipment/Purposeful Proactive Rounding/Room/bathroom lighting operational, light cord in reach Assistance OOB with selected safe patient handling equipment if applicable/Assistance with ambulation/Communicate fall risk and risk factors to all staff, patient, and family/Monitor gait and stability/Provide patient with walking aids/Provide visual cue: yellow wristband, yellow gown, etc/Reinforce activity limits and safety measures with patient and family/Call bell, personal items and telephone in reach/Instruct patient to call for assistance before getting out of bed/chair/stretcher/Non-slip footwear applied when patient is off stretcher/[place] to call system/Physically safe environment - no spills, clutter or unnecessary equipment/Purposeful Proactive Rounding/Room/bathroom lighting operational, light cord in reach

## 2023-12-09 ENCOUNTER — TRANSCRIPTION ENCOUNTER (OUTPATIENT)
Age: 73
End: 2023-12-09

## 2023-12-09 DIAGNOSIS — Z29.9 ENCOUNTER FOR PROPHYLACTIC MEASURES, UNSPECIFIED: ICD-10-CM

## 2023-12-09 DIAGNOSIS — I48.20 CHRONIC ATRIAL FIBRILLATION, UNSPECIFIED: ICD-10-CM

## 2023-12-09 DIAGNOSIS — K62.9 DISEASE OF ANUS AND RECTUM, UNSPECIFIED: ICD-10-CM

## 2023-12-09 DIAGNOSIS — R63.8 OTHER SYMPTOMS AND SIGNS CONCERNING FOOD AND FLUID INTAKE: ICD-10-CM

## 2023-12-09 DIAGNOSIS — I25.10 ATHEROSCLEROTIC HEART DISEASE OF NATIVE CORONARY ARTERY WITHOUT ANGINA PECTORIS: ICD-10-CM

## 2023-12-09 DIAGNOSIS — I50.22 CHRONIC SYSTOLIC (CONGESTIVE) HEART FAILURE: ICD-10-CM

## 2023-12-09 LAB
ALBUMIN SERPL ELPH-MCNC: 3.8 G/DL — SIGNIFICANT CHANGE UP (ref 3.3–5)
ALBUMIN SERPL ELPH-MCNC: 3.8 G/DL — SIGNIFICANT CHANGE UP (ref 3.3–5)
ALP SERPL-CCNC: 101 U/L — SIGNIFICANT CHANGE UP (ref 40–120)
ALP SERPL-CCNC: 101 U/L — SIGNIFICANT CHANGE UP (ref 40–120)
ALT FLD-CCNC: 24 U/L — SIGNIFICANT CHANGE UP (ref 4–41)
ALT FLD-CCNC: 24 U/L — SIGNIFICANT CHANGE UP (ref 4–41)
ANION GAP SERPL CALC-SCNC: 12 MMOL/L — SIGNIFICANT CHANGE UP (ref 7–14)
ANION GAP SERPL CALC-SCNC: 12 MMOL/L — SIGNIFICANT CHANGE UP (ref 7–14)
APTT BLD: 37.4 SEC — HIGH (ref 24.5–35.6)
APTT BLD: 37.4 SEC — HIGH (ref 24.5–35.6)
AST SERPL-CCNC: 26 U/L — SIGNIFICANT CHANGE UP (ref 4–40)
AST SERPL-CCNC: 26 U/L — SIGNIFICANT CHANGE UP (ref 4–40)
BASOPHILS # BLD AUTO: 0.04 K/UL — SIGNIFICANT CHANGE UP (ref 0–0.2)
BASOPHILS # BLD AUTO: 0.04 K/UL — SIGNIFICANT CHANGE UP (ref 0–0.2)
BASOPHILS NFR BLD AUTO: 0.5 % — SIGNIFICANT CHANGE UP (ref 0–2)
BASOPHILS NFR BLD AUTO: 0.5 % — SIGNIFICANT CHANGE UP (ref 0–2)
BILIRUB SERPL-MCNC: 0.4 MG/DL — SIGNIFICANT CHANGE UP (ref 0.2–1.2)
BILIRUB SERPL-MCNC: 0.4 MG/DL — SIGNIFICANT CHANGE UP (ref 0.2–1.2)
BLD GP AB SCN SERPL QL: NEGATIVE — SIGNIFICANT CHANGE UP
BUN SERPL-MCNC: 20 MG/DL — SIGNIFICANT CHANGE UP (ref 7–23)
BUN SERPL-MCNC: 20 MG/DL — SIGNIFICANT CHANGE UP (ref 7–23)
CALCIUM SERPL-MCNC: 9.3 MG/DL — SIGNIFICANT CHANGE UP (ref 8.4–10.5)
CALCIUM SERPL-MCNC: 9.3 MG/DL — SIGNIFICANT CHANGE UP (ref 8.4–10.5)
CEA SERPL-MCNC: 4.3 NG/ML — HIGH (ref 1–3.8)
CEA SERPL-MCNC: 4.3 NG/ML — HIGH (ref 1–3.8)
CHLORIDE SERPL-SCNC: 104 MMOL/L — SIGNIFICANT CHANGE UP (ref 98–107)
CHLORIDE SERPL-SCNC: 104 MMOL/L — SIGNIFICANT CHANGE UP (ref 98–107)
CO2 SERPL-SCNC: 26 MMOL/L — SIGNIFICANT CHANGE UP (ref 22–31)
CO2 SERPL-SCNC: 26 MMOL/L — SIGNIFICANT CHANGE UP (ref 22–31)
CREAT SERPL-MCNC: 1.6 MG/DL — HIGH (ref 0.5–1.3)
CREAT SERPL-MCNC: 1.6 MG/DL — HIGH (ref 0.5–1.3)
EGFR: 45 ML/MIN/1.73M2 — LOW
EGFR: 45 ML/MIN/1.73M2 — LOW
EOSINOPHIL # BLD AUTO: 0.14 K/UL — SIGNIFICANT CHANGE UP (ref 0–0.5)
EOSINOPHIL # BLD AUTO: 0.14 K/UL — SIGNIFICANT CHANGE UP (ref 0–0.5)
EOSINOPHIL NFR BLD AUTO: 1.7 % — SIGNIFICANT CHANGE UP (ref 0–6)
EOSINOPHIL NFR BLD AUTO: 1.7 % — SIGNIFICANT CHANGE UP (ref 0–6)
GLUCOSE SERPL-MCNC: 82 MG/DL — SIGNIFICANT CHANGE UP (ref 70–99)
GLUCOSE SERPL-MCNC: 82 MG/DL — SIGNIFICANT CHANGE UP (ref 70–99)
HCT VFR BLD CALC: 38.1 % — LOW (ref 39–50)
HCT VFR BLD CALC: 38.1 % — LOW (ref 39–50)
HGB BLD-MCNC: 12.1 G/DL — LOW (ref 13–17)
HGB BLD-MCNC: 12.1 G/DL — LOW (ref 13–17)
HIV 1+2 AB+HIV1 P24 AG SERPL QL IA: SIGNIFICANT CHANGE UP
HIV 1+2 AB+HIV1 P24 AG SERPL QL IA: SIGNIFICANT CHANGE UP
IANC: 6.4 K/UL — SIGNIFICANT CHANGE UP (ref 1.8–7.4)
IANC: 6.4 K/UL — SIGNIFICANT CHANGE UP (ref 1.8–7.4)
IMM GRANULOCYTES NFR BLD AUTO: 0.5 % — SIGNIFICANT CHANGE UP (ref 0–0.9)
IMM GRANULOCYTES NFR BLD AUTO: 0.5 % — SIGNIFICANT CHANGE UP (ref 0–0.9)
INR BLD: 1.22 RATIO — HIGH (ref 0.85–1.18)
INR BLD: 1.22 RATIO — HIGH (ref 0.85–1.18)
LYMPHOCYTES # BLD AUTO: 0.85 K/UL — LOW (ref 1–3.3)
LYMPHOCYTES # BLD AUTO: 0.85 K/UL — LOW (ref 1–3.3)
LYMPHOCYTES # BLD AUTO: 10.1 % — LOW (ref 13–44)
LYMPHOCYTES # BLD AUTO: 10.1 % — LOW (ref 13–44)
MAGNESIUM SERPL-MCNC: 2.3 MG/DL — SIGNIFICANT CHANGE UP (ref 1.6–2.6)
MAGNESIUM SERPL-MCNC: 2.3 MG/DL — SIGNIFICANT CHANGE UP (ref 1.6–2.6)
MCHC RBC-ENTMCNC: 29.2 PG — SIGNIFICANT CHANGE UP (ref 27–34)
MCHC RBC-ENTMCNC: 29.2 PG — SIGNIFICANT CHANGE UP (ref 27–34)
MCHC RBC-ENTMCNC: 31.8 GM/DL — LOW (ref 32–36)
MCHC RBC-ENTMCNC: 31.8 GM/DL — LOW (ref 32–36)
MCV RBC AUTO: 91.8 FL — SIGNIFICANT CHANGE UP (ref 80–100)
MCV RBC AUTO: 91.8 FL — SIGNIFICANT CHANGE UP (ref 80–100)
MONOCYTES # BLD AUTO: 0.96 K/UL — HIGH (ref 0–0.9)
MONOCYTES # BLD AUTO: 0.96 K/UL — HIGH (ref 0–0.9)
MONOCYTES NFR BLD AUTO: 11.4 % — SIGNIFICANT CHANGE UP (ref 2–14)
MONOCYTES NFR BLD AUTO: 11.4 % — SIGNIFICANT CHANGE UP (ref 2–14)
NEUTROPHILS # BLD AUTO: 6.4 K/UL — SIGNIFICANT CHANGE UP (ref 1.8–7.4)
NEUTROPHILS # BLD AUTO: 6.4 K/UL — SIGNIFICANT CHANGE UP (ref 1.8–7.4)
NEUTROPHILS NFR BLD AUTO: 75.8 % — SIGNIFICANT CHANGE UP (ref 43–77)
NEUTROPHILS NFR BLD AUTO: 75.8 % — SIGNIFICANT CHANGE UP (ref 43–77)
NRBC # BLD: 0 /100 WBCS — SIGNIFICANT CHANGE UP (ref 0–0)
NRBC # BLD: 0 /100 WBCS — SIGNIFICANT CHANGE UP (ref 0–0)
NRBC # FLD: 0 K/UL — SIGNIFICANT CHANGE UP (ref 0–0)
NRBC # FLD: 0 K/UL — SIGNIFICANT CHANGE UP (ref 0–0)
PHOSPHATE SERPL-MCNC: 3.5 MG/DL — SIGNIFICANT CHANGE UP (ref 2.5–4.5)
PHOSPHATE SERPL-MCNC: 3.5 MG/DL — SIGNIFICANT CHANGE UP (ref 2.5–4.5)
PLATELET # BLD AUTO: 165 K/UL — SIGNIFICANT CHANGE UP (ref 150–400)
PLATELET # BLD AUTO: 165 K/UL — SIGNIFICANT CHANGE UP (ref 150–400)
POTASSIUM SERPL-MCNC: 4.3 MMOL/L — SIGNIFICANT CHANGE UP (ref 3.5–5.3)
POTASSIUM SERPL-MCNC: 4.3 MMOL/L — SIGNIFICANT CHANGE UP (ref 3.5–5.3)
POTASSIUM SERPL-SCNC: 4.3 MMOL/L — SIGNIFICANT CHANGE UP (ref 3.5–5.3)
POTASSIUM SERPL-SCNC: 4.3 MMOL/L — SIGNIFICANT CHANGE UP (ref 3.5–5.3)
PROT SERPL-MCNC: 6.9 G/DL — SIGNIFICANT CHANGE UP (ref 6–8.3)
PROT SERPL-MCNC: 6.9 G/DL — SIGNIFICANT CHANGE UP (ref 6–8.3)
PROTHROM AB SERPL-ACNC: 13.7 SEC — HIGH (ref 9.5–13)
PROTHROM AB SERPL-ACNC: 13.7 SEC — HIGH (ref 9.5–13)
RBC # BLD: 4.15 M/UL — LOW (ref 4.2–5.8)
RBC # BLD: 4.15 M/UL — LOW (ref 4.2–5.8)
RBC # FLD: 15 % — HIGH (ref 10.3–14.5)
RBC # FLD: 15 % — HIGH (ref 10.3–14.5)
RH IG SCN BLD-IMP: POSITIVE — SIGNIFICANT CHANGE UP
SODIUM SERPL-SCNC: 142 MMOL/L — SIGNIFICANT CHANGE UP (ref 135–145)
SODIUM SERPL-SCNC: 142 MMOL/L — SIGNIFICANT CHANGE UP (ref 135–145)
WBC # BLD: 8.43 K/UL — SIGNIFICANT CHANGE UP (ref 3.8–10.5)
WBC # BLD: 8.43 K/UL — SIGNIFICANT CHANGE UP (ref 3.8–10.5)
WBC # FLD AUTO: 8.43 K/UL — SIGNIFICANT CHANGE UP (ref 3.8–10.5)
WBC # FLD AUTO: 8.43 K/UL — SIGNIFICANT CHANGE UP (ref 3.8–10.5)

## 2023-12-09 PROCEDURE — 99223 1ST HOSP IP/OBS HIGH 75: CPT

## 2023-12-09 PROCEDURE — 99223 1ST HOSP IP/OBS HIGH 75: CPT | Mod: GC

## 2023-12-09 RX ORDER — LISINOPRIL 2.5 MG/1
1 TABLET ORAL
Qty: 0 | Refills: 0 | DISCHARGE

## 2023-12-09 RX ORDER — AMIODARONE HYDROCHLORIDE 400 MG/1
200 TABLET ORAL DAILY
Refills: 0 | Status: DISCONTINUED | OUTPATIENT
Start: 2023-12-09 | End: 2023-12-11

## 2023-12-09 RX ORDER — APIXABAN 2.5 MG/1
1 TABLET, FILM COATED ORAL
Qty: 0 | Refills: 0 | DISCHARGE

## 2023-12-09 RX ORDER — CARVEDILOL PHOSPHATE 80 MG/1
1 CAPSULE, EXTENDED RELEASE ORAL
Refills: 0 | DISCHARGE

## 2023-12-09 RX ORDER — CLOPIDOGREL BISULFATE 75 MG/1
1 TABLET, FILM COATED ORAL
Refills: 0 | DISCHARGE

## 2023-12-09 RX ORDER — LIDOCAINE HCL 20 MG/ML
20 VIAL (ML) INJECTION ONCE
Refills: 0 | Status: DISCONTINUED | OUTPATIENT
Start: 2023-12-09 | End: 2023-12-12

## 2023-12-09 RX ORDER — EMPAGLIFLOZIN 10 MG/1
1 TABLET, FILM COATED ORAL
Refills: 0 | DISCHARGE

## 2023-12-09 RX ORDER — EPLERENONE 50 MG/1
1 TABLET, FILM COATED ORAL
Qty: 0 | Refills: 0 | DISCHARGE

## 2023-12-09 RX ORDER — METOPROLOL TARTRATE 50 MG
12.5 TABLET ORAL EVERY 12 HOURS
Refills: 0 | Status: DISCONTINUED | OUTPATIENT
Start: 2023-12-09 | End: 2023-12-09

## 2023-12-09 RX ORDER — CLOPIDOGREL BISULFATE 75 MG/1
1 TABLET, FILM COATED ORAL
Qty: 0 | Refills: 0 | DISCHARGE

## 2023-12-09 RX ORDER — CARVEDILOL PHOSPHATE 80 MG/1
12.5 CAPSULE, EXTENDED RELEASE ORAL EVERY 12 HOURS
Refills: 0 | Status: DISCONTINUED | OUTPATIENT
Start: 2023-12-09 | End: 2023-12-11

## 2023-12-09 RX ORDER — FUROSEMIDE 40 MG
1 TABLET ORAL
Refills: 0 | DISCHARGE

## 2023-12-09 RX ORDER — AMLODIPINE BESYLATE 2.5 MG/1
1 TABLET ORAL
Qty: 0 | Refills: 0 | DISCHARGE

## 2023-12-09 RX ORDER — METOPROLOL TARTRATE 50 MG
1 TABLET ORAL
Qty: 0 | Refills: 0 | DISCHARGE

## 2023-12-09 RX ORDER — SACUBITRIL AND VALSARTAN 24; 26 MG/1; MG/1
1 TABLET, FILM COATED ORAL
Refills: 0 | DISCHARGE

## 2023-12-09 RX ORDER — AMIODARONE HYDROCHLORIDE 400 MG/1
1 TABLET ORAL
Refills: 0 | DISCHARGE

## 2023-12-09 RX ORDER — ATORVASTATIN CALCIUM 80 MG/1
1 TABLET, FILM COATED ORAL
Qty: 0 | Refills: 0 | DISCHARGE

## 2023-12-09 RX ORDER — ATORVASTATIN CALCIUM 80 MG/1
1 TABLET, FILM COATED ORAL
Refills: 0 | DISCHARGE

## 2023-12-09 RX ORDER — ATORVASTATIN CALCIUM 80 MG/1
80 TABLET, FILM COATED ORAL AT BEDTIME
Refills: 0 | Status: DISCONTINUED | OUTPATIENT
Start: 2023-12-09 | End: 2023-12-12

## 2023-12-09 RX ADMIN — Medication 100 MILLIGRAM(S): at 08:02

## 2023-12-09 RX ADMIN — CEFTRIAXONE 100 MILLIGRAM(S): 500 INJECTION, POWDER, FOR SOLUTION INTRAMUSCULAR; INTRAVENOUS at 23:02

## 2023-12-09 RX ADMIN — Medication 100 MILLIGRAM(S): at 23:02

## 2023-12-09 RX ADMIN — Medication 100 MILLIGRAM(S): at 17:29

## 2023-12-09 RX ADMIN — ATORVASTATIN CALCIUM 80 MILLIGRAM(S): 80 TABLET, FILM COATED ORAL at 23:02

## 2023-12-09 NOTE — DISCHARGE NOTE PROVIDER - CARE PROVIDER_API CALL
Som Lopes  Interventional Cardiology  [address] [place] [address],  [address]  Phone: [phone]  Fax: [phone]  Follow Up Time:    Som Lopes  Interventional Cardiology  [address] [place] [address],  [address]  Phone: [phone]  Fax: [phone]  Follow Up Time:    Som Lopes  Interventional Cardiology  [address],  [address]  Phone: [phone]  Fax: [phone]  Follow Up Time:     Nasim Resendiz  Colon/Rectal Surgery  [address] [facility], [facility] [address]  [place] [address]  Phone: [phone]  Fax: [phone]  Follow Up Time:     Marvin Turner  Gastroenterology  [address] [facility], [facility] [address]  [place] [address]  Phone: [phone]  Fax: [phone]  Follow Up Time:    Som Lopes  Interventional Cardiology  [address],  [address]  Phone: [phone]  Fax: [phone]  Follow Up Time:     Nasim Resendiz  Colon/Rectal Surgery  [address] [facility], [facility] [address]  [place] [address]  Phone: [phone]  Fax: [phone]  Follow Up Time:     Marvin Turner  Gastroenterology  [address] [facility], [facility] [address]  [place] [address]  Phone: [phone]  Fax: [phone]  Follow Up Time:

## 2023-12-09 NOTE — PATIENT PROFILE ADULT - FALL HARM RISK - RISK INTERVENTIONS
Assistance OOB with selected safe patient handling equipment/Assistance with ambulation/Communicate Fall Risk and Risk Factors to all staff, patient, and family/Reinforce activity limits and safety measures with patient and family/Visual Cue: Yellow wristband/Bed in lowest position, wheels locked, appropriate side rails in place/Call bell, personal items and telephone in reach/Instruct patient to call for assistance before getting out of bed or chair/Non-slip footwear when patient is out of bed/[place] to call system/Physically safe environment - no spills, clutter or unnecessary equipment/Purposeful Proactive Rounding/Room/bathroom lighting operational, light cord in reach Assistance OOB with selected safe patient handling equipment/Assistance with ambulation/Communicate Fall Risk and Risk Factors to all staff, patient, and family/Reinforce activity limits and safety measures with patient and family/Visual Cue: Yellow wristband/Bed in lowest position, wheels locked, appropriate side rails in place/Call bell, personal items and telephone in reach/Instruct patient to call for assistance before getting out of bed or chair/Non-slip footwear when patient is out of bed/[place] to call system/Physically safe environment - no spills, clutter or unnecessary equipment/Purposeful Proactive Rounding/Room/bathroom lighting operational, light cord in reach

## 2023-12-09 NOTE — DISCHARGE NOTE PROVIDER - NSDCCPCAREPLAN_GEN_ALL_CORE_FT
PRINCIPAL DISCHARGE DIAGNOSIS  Diagnosis: Rectal abscess  Assessment and Plan of Treatment: We found a rectal mass on CT scan. Please follow up with your primary care provider to obtain an MRI. Please follow up with your gastroenterologist for a biopsy. Please take antibiotics     PRINCIPAL DISCHARGE DIAGNOSIS  Diagnosis: Rectal abscess  Assessment and Plan of Treatment: We found a rectal mass on CT scan. Please follow up with your primary care provider to obtain an MRI. Please follow up with your gastroenterologist for a biopsy and general surgery for follow up after the drainage. Please take augmentin for 8 more days.

## 2023-12-09 NOTE — DISCHARGE NOTE PROVIDER - NPI NUMBER (FOR SYSADMIN USE ONLY) :
[[id number]] [[id number]] [[id number]],[[id number]],[[id number]] [[id number]],[[id number]],[[id number]]

## 2023-12-09 NOTE — PROGRESS NOTE ADULT - SUBJECTIVE AND OBJECTIVE BOX
INTERVAL: Admitted overnight.  SUBJECTIVE: Patient examined bedside this AM.    OBJECTIVE:  Vital Signs Last 24 Hrs  T(C): 36.6 (09 Dec 2023 03:54), Max: 37.4 (08 Dec 2023 17:16)  T(F): 97.9 (09 Dec 2023 03:54), Max: 99.4 (08 Dec 2023 17:16)  HR: 54 (09 Dec 2023 03:54) (54 - 64)  BP: 112/56 (09 Dec 2023 03:54) (108/48 - 119/61)  RR: 18 (09 Dec 2023 03:54) (16 - 18)  SpO2: 100% (09 Dec 2023 03:54) (97% - 100%)    O2 Parameters below as of 09 Dec 2023 03:54  Patient On (Oxygen Delivery Method): room air    PHYSICAL EXAM:  General: NAD, laying in bed  HEENT: PERRLA, EOMI, sclera non-icteric  Neck: JVD absent  Respiratory: Clear to ascultation bilaterally, no crackles/rales, no accessory muscle use  Cardiovascular: RRR, no murmurs/rubs/gallops  Abdomen: Soft, NT, ND  Extremities: No LE edema  Skin: No rashes or lesions   Neurological: Sensation grossly intact, strength 5/5 in all extremities  Psychiatry: AOx3, appropriate insight/judgement, appropriate affect, recent/remote memory intact    PRN Meds:  acetaminophen     Tablet .. 650 milliGRAM(s) Oral every 6 hours PRN    LABS:                        12.1   8.43  )-----------( 165      ( 09 Dec 2023 06:43 )             38.1     Hgb Trend: 12.1<--, 13.2<--  12-08    x   |  x   |  x   ----------------------------<  x   4.9   |  x   |  x     Ca    9.4      08 Dec 2023 13:51    TPro  7.5  /  Alb  3.8  /  TBili  0.7  /  DBili  x   /  AST  38  /  ALT  24  /  AlkPhos  109  12-08    Creatinine Trend: 1.66<--    PT/INR - ( 08 Dec 2023 13:51 )   PT: 16.1 sec;   INR: 1.45 ratio    PTT - ( 08 Dec 2023 13:51 )  PTT:40.7 sec    Urinalysis Basic - ( 08 Dec 2023 13:51 )  Color: x / Appearance: x / SG: x / pH: x  Gluc: 94 mg/dL / Ketone: x  / Bili: x / Urobili: x   Blood: x / Protein: x / Nitrite: x   Leuk Esterase: x / RBC: x / WBC x   Sq Epi: x / Non Sq Epi: x / Bacteria: x    Venous Blood Gas:  12-08 @ 13:51  7.31/63/30/32/37.1  VBG Lactate: 2.0 INTERVAL: Admitted overnight.  SUBJECTIVE: Patient examined bedside this AM. Not feeling significant pain but does endorse intermittent perianal pruritis.      OBJECTIVE:  Vital Signs Last 24 Hrs  T(C): 36.6 (09 Dec 2023 03:54), Max: 37.4 (08 Dec 2023 17:16)  T(F): 97.9 (09 Dec 2023 03:54), Max: 99.4 (08 Dec 2023 17:16)  HR: 54 (09 Dec 2023 03:54) (54 - 64)  BP: 112/56 (09 Dec 2023 03:54) (108/48 - 119/61)  RR: 18 (09 Dec 2023 03:54) (16 - 18)  SpO2: 100% (09 Dec 2023 03:54) (97% - 100%)    O2 Parameters below as of 09 Dec 2023 03:54  Patient On (Oxygen Delivery Method): room air    PHYSICAL EXAM:  General: NAD, laying in bed  HEENT: Sclera non-icteric  Respiratory: Clear to ascultation bilaterally, no crackles/rales, no accessory muscle use  Cardiovascular: RRR, no murmurs/rubs/gallops  Abdomen: Soft, NT, ND  Extremities: No LE edema  Skin: No rashes  Neurological: Sensation grossly intact, strength 5/5 in all extremities  Psychiatry: AOx3, appropriate insight/judgement, appropriate affect, recent/remote memory intact    PRN Meds:  acetaminophen     Tablet .. 650 milliGRAM(s) Oral every 6 hours PRN    LABS:                        12.1   8.43  )-----------( 165      ( 09 Dec 2023 06:43 )             38.1     Hgb Trend: 12.1<--, 13.2<--  12-08    x   |  x   |  x   ----------------------------<  x   4.9   |  x   |  x     Ca    9.4      08 Dec 2023 13:51    TPro  7.5  /  Alb  3.8  /  TBili  0.7  /  DBili  x   /  AST  38  /  ALT  24  /  AlkPhos  109  12-08    Creatinine Trend: 1.66<--    PT/INR - ( 08 Dec 2023 13:51 )   PT: 16.1 sec;   INR: 1.45 ratio    PTT - ( 08 Dec 2023 13:51 )  PTT:40.7 sec    Urinalysis Basic - ( 08 Dec 2023 13:51 )  Color: x / Appearance: x / SG: x / pH: x  Gluc: 94 mg/dL / Ketone: x  / Bili: x / Urobili: x   Blood: x / Protein: x / Nitrite: x   Leuk Esterase: x / RBC: x / WBC x   Sq Epi: x / Non Sq Epi: x / Bacteria: x    Venous Blood Gas:  12-08 @ 13:51  7.31/63/30/32/37.1  VBG Lactate: 2.0

## 2023-12-09 NOTE — H&P ADULT - ASSESSMENT
73 year old M hx of afib on xarelto, systolic CHF, CAD w/ stent, who presents w/ blood in stool and difficulty pooping. He had colonoscopy 20 years ago, and thinks it was normal at this time.

## 2023-12-09 NOTE — H&P ADULT - NSHPLABSRESULTS_GEN_ALL_CORE
.  LABS:                         13.2   12.23 )-----------( 180      ( 08 Dec 2023 13:51 )             40.8     12-08    x   |  x   |  x   ----------------------------<  x   4.9   |  x   |  x     Ca    9.4      08 Dec 2023 13:51    TPro  7.5  /  Alb  3.8  /  TBili  0.7  /  DBili  x   /  AST  38  /  ALT  24  /  AlkPhos  109  12-08    PT/INR - ( 08 Dec 2023 13:51 )   PT: 16.1 sec;   INR: 1.45 ratio         PTT - ( 08 Dec 2023 13:51 )  PTT:40.7 sec  Urinalysis Basic - ( 08 Dec 2023 13:51 )    Color: x / Appearance: x / SG: x / pH: x  Gluc: 94 mg/dL / Ketone: x  / Bili: x / Urobili: x   Blood: x / Protein: x / Nitrite: x   Leuk Esterase: x / RBC: x / WBC x   Sq Epi: x / Non Sq Epi: x / Bacteria: x      < from: CT Abdomen and Pelvis w/ IV Cont (12.08.23 @ 15:34) >    IMPRESSION:  A 3.2 cm ill-defined hypoattenuating lesion in the posterior aspect of   the rectum in the mid to distal anal canal with possible fistula   extending to the skin surface of the left intergluteal cleft. Associated   surrounding perineal subcutaneous fat stranding. Differential includes a   perianal fistula with phlegmon/early developing abscess. However,   underlying lesion needs to be ruled out.Recommend further evaluation   with pelvic MR with perianal fistula protocol.    < end of copied text >

## 2023-12-09 NOTE — H&P ADULT - PROBLEM SELECTOR PLAN 5
F-none  E-replete  N-NPO in case of procedure, DASH/TLC w/ 1 L restriction if diet is ordered  SCD for DVT prophylaxis

## 2023-12-09 NOTE — PROGRESS NOTE ADULT - PROBLEM SELECTOR PLAN 4
-s/p 1 L  -hold further fluids unless patient becomes hypotensive S/p 1 L IV fluids    Plan:  -Hold further fluids unless patient becomes hypotensive  -Lasix PRN as above

## 2023-12-09 NOTE — DISCHARGE NOTE PROVIDER - CARE PROVIDERS DIRECT ADDRESSES
,DirectAddress_Unknown ,DirectAddress_Unknown,[email or web address] ,DirectAddress_Unknown,[email or web address]

## 2023-12-09 NOTE — DISCHARGE NOTE PROVIDER - NSFOLLOWUPCLINICS_GEN_ALL_ED_FT
Gastroenterology at [facility]  Gastroenterology  [address] [place] [address]  Phone: [phone]  Fax:      Gastroenterology at [facility]  Gastroenterology  [address] [place] [address]  Phone: [phone]  Fax:

## 2023-12-09 NOTE — DISCHARGE NOTE PROVIDER - HOSPITAL COURSE
73M hx of afib on xarelto, systolic CHF, CAD w/ stent, who presents w/ blood in stool. He had colonoscopy 20 years ago, and thinks it was normal at this time. He endorses several pounds weight loss 2/2 management of CHF. Denies fevers, chills, nausea, vomiting, diarrhea, sob, headaches, visual changes, LE swelling, or melena. In the ED CT a/p iv contrast w/ ill defined lesion 3.2 cm rectum concern for abscess vs neoplasm. Colorectal surg consulted, s/p I&D 12/9. Gastroenterology consulted. MR pelvis showed..    Medication changes:  -None    Follow up:  -Primary care doctor 73M hx of afib on xarelto, systolic CHF, CAD w/ stent, who presents w/ blood in stool. He had colonoscopy 20 years ago, and thinks it was normal at this time. He endorses several pounds weight loss 2/2 management of CHF. Denies fevers, chills, nausea, vomiting, diarrhea, sob, headaches, visual changes, LE swelling, or melena. In the ED CT a/p iv contrast w/ ill defined lesion 3.2 cm rectum concern for abscess vs neoplasm. Colorectal surg consulted, s/p I&D 12/9. Gastroenterology consulted, recommended MR pelvis and possible colonoscopy/flex sig after. Unfortunately, MR department behind with many patients needing MRIs and patient had to wait 4 days without MRI. Pt decided to leave AMA, understanding the risks that the rectal mass may be infectious or cancerous and the need to follow up with gastroenterology and primary care provider and MRI outpatient.    Medication changes:  -Flagyl    Follow up:  -Primary care doctor  -Gastroenterology 73M hx of afib on xarelto, systolic CHF, CAD w/ stent, who presents w/ blood in stool. He had colonoscopy 20 years ago, and thinks it was normal at this time. He endorses several pounds weight loss 2/2 management of CHF. Denies fevers, chills, nausea, vomiting, diarrhea, sob, headaches, visual changes, LE swelling, or melena. In the ED CT a/p iv contrast w/ ill defined lesion 3.2 cm rectum concern for abscess vs neoplasm. Colorectal surg consulted, s/p I&D 12/9. Gastroenterology consulted, recommended MR pelvis and possible colonoscopy/flex sig after. Unfortunately, MR department behind with many patients needing MRIs and patient had to wait 4 days without MRI. After discussion, pt decided to follow up MRI  and colonoscopy outpatient, understanding the risks that the rectal mass may be infectious or cancerous and the need to follow up with gastroenterology and primary care provider and MRI outpatient.    Medication changes:  -Augmentin 8 days to finish 10 day course after I&D    Follow up:  -Primary care doctor/MRI  -Gastroenterology  -General Surgery 73M hx of afib on xarelto, systolic CHF, CAD w/ stent, who presents w/ blood in stool. He had colonoscopy 20 years ago, and thinks it was normal at this time. He endorses several pounds weight loss 2/2 management of CHF. Denies fevers, chills, nausea, vomiting, diarrhea, sob, headaches, visual changes, LE swelling, or melena. In the ED CT a/p iv contrast w/ ill defined lesion 3.2 cm rectum concern for abscess vs neoplasm. Colorectal surg consulted, s/p I&D 12/9. Gastroenterology consulted, recommended MR pelvis and possible colonoscopy/flex sig after. Unfortunately, MR department behind with many patients needing MRIs and patient had to wait 4 days without MRI. After discussion, pt decided to follow up MRI  and colonoscopy outpatient, understanding the risks that the rectal mass may be infectious or cancerous and the need to follow up with gastroenterology and primary care provider and MRI outpatient.    Medication changes:  -Augmentin 8 days to finish 10 day course after I&D    Follow up:  -Primary care doctor/MRI  -Gastroenterology  -General Surgery    Jamia Mitchell MD  Medicine Attending  Teams preferred/Pager: [phone]    I have personally seen and examined patient. I discussed the case with Dr. Rivera and agree with findings and plan as detailed per note above. 73M hx of afib on xarelto, systolic CHF, CAD w/ stent, who presents w/ blood in stool. He had colonoscopy 20 years ago, and thinks it was normal at this time. He endorses several pounds weight loss 2/2 management of CHF. Denies fevers, chills, nausea, vomiting, diarrhea, sob, headaches, visual changes, LE swelling, or melena. In the ED CT a/p iv contrast w/ ill defined lesion 3.2 cm rectum concern for abscess vs neoplasm. Colorectal surg consulted, s/p I&D 12/9. Gastroenterology consulted, recommended MR pelvis and possible colonoscopy/flex sig after. Unfortunately, MR department behind with many patients needing MRIs and patient had to wait 4 days without MRI. After discussion, pt decided to follow up MRI  and colonoscopy outpatient, understanding the risks that the rectal mass may be infectious or cancerous and the need to follow up with gastroenterology and primary care provider and MRI outpatient.    Medication changes:  -Augmentin 8 days to finish 10 day course after I&D    Follow up:  -Primary care doctor/MRI  -Gastroenterology  -General Surgery    Jamia Mitchell MD  Medicine Attending  Teams preferred/Pager: [phone]    I have personally seen and examined patient. I discussed the case with Dr. Rivera and agree with findings and plan as detailed per note above. 73M hx of afib on xarelto, systolic CHF, CAD w/ stent, who presents w/ blood in stool. He had colonoscopy 20 years ago, and thinks it was normal at this time. He endorses several pounds weight loss 2/2 management of CHF. Denies fevers, chills, nausea, vomiting, diarrhea, sob, headaches, visual changes, LE swelling, or melena. In the ED CT a/p iv contrast w/ ill defined lesion 3.2 cm rectum concern for abscess vs neoplasm. Colorectal surg consulted, s/p I&D 12/9. Gastroenterology consulted, recommended MR pelvis and possible colonoscopy/flex sig after. Unfortunately, MR department behind with many patients needing MRIs and patient had to wait 4 days without MRI. After discussion, pt decided to follow up MRI  and colonoscopy outpatient, understanding the risks that the rectal mass may be infectious or cancerous and the need to follow up with gastroenterology and primary care provider and MRI outpatient.    Medication changes:  -Augmentin 8 days to finish 10 day course after I&D    Follow up:  -Primary care doctor/MRI  -Gastroenterology  -General Surgery    Jamia Mitchell MD  Medicine Attending  Teams preferred/Pager: [phone]    I have personally seen and examined patient. I discussed the case with Dr. Rivera and agree with findings and plan as detailed per note above. 74 y/o M w/hx of afib on xarelto, HFrEF, cad s/p stent, presents with rectal bleeding found to have possible abscess vs neoplasm on imaging. CRS consulted, s/p I&D on 12/9. GI consulted, recommending imaging. GIven delay in imaging, discussed with team and agreeable to discharge with expediated outpatient f/u. Pt will complete 10 day course of augmentin from I&D. He will f/u with CRS and GI. Pt ok to resume AC.    return precautions given. stable for discharge.    >33 min have been spent in the care and coordination of this patient's care 73M hx of afib on xarelto, systolic CHF, CAD w/ stent, who presents w/ blood in stool. He had colonoscopy 20 years ago, and thinks it was normal at this time. He endorses several pounds weight loss 2/2 management of CHF. Denies fevers, chills, nausea, vomiting, diarrhea, sob, headaches, visual changes, LE swelling, or melena. In the ED CT a/p iv contrast w/ ill defined lesion 3.2 cm rectum concern for abscess vs neoplasm. Colorectal surg consulted, s/p I&D 12/9. Gastroenterology consulted, recommended MR pelvis and possible colonoscopy/flex sig after. Unfortunately, MR department behind with many patients needing MRIs and patient had to wait 4 days without MRI. After discussion, pt decided to follow up MRI  and colonoscopy outpatient, understanding the risks that the rectal mass may be infectious or cancerous and the need to follow up with gastroenterology and primary care provider and MRI outpatient.    Medication changes:  -Augmentin 8 days to finish 10 day course after I&D    Follow up:  -Primary care doctor/MRI  -Gastroenterology  -General Surgery    Jamia Mitchell MD  Medicine Attending  Teams preferred/Pager: [phone]    I have personally seen and examined patient. I discussed the case with Dr. Rivera and agree with findings and plan as detailed per note above. 72 y/o M w/hx of afib on xarelto, HFrEF, cad s/p stent, presents with rectal bleeding found to have possible abscess vs neoplasm on imaging. CRS consulted, s/p I&D on 12/9. GI consulted, recommending imaging. GIven delay in imaging, discussed with team and agreeable to discharge with expediated outpatient f/u. Pt will complete 10 day course of augmentin from I&D. He will f/u with CRS and GI. Pt ok to resume AC.    return precautions given. stable for discharge.    >33 min have been spent in the care and coordination of this patient's care

## 2023-12-09 NOTE — ED ADULT NURSE REASSESSMENT NOTE - NS ED NURSE REASSESS COMMENT FT1
Report given to CASI Hoskins. Patient is stable and reports no pain. Vitals taken and reported. Patient is awaiting bed.

## 2023-12-09 NOTE — PROGRESS NOTE ADULT - SUBJECTIVE AND OBJECTIVE BOX
Surgery Progress Note     Subjective:  Patient seen and examined on AM rounds. Denies BRBPR overnight, no nonbloody BMs yet either. Patient endorses      Vital Signs:  Vital Signs Last 24 Hrs  T(C): 36.6 (09 Dec 2023 03:54), Max: 37.4 (08 Dec 2023 17:16)  T(F): 97.9 (09 Dec 2023 03:54), Max: 99.4 (08 Dec 2023 17:16)  HR: 54 (09 Dec 2023 03:54) (54 - 64)  BP: 112/56 (09 Dec 2023 03:54) (108/48 - 119/61)  BP(mean): --  RR: 18 (09 Dec 2023 03:54) (16 - 18)  SpO2: 100% (09 Dec 2023 03:54) (97% - 100%)    Parameters below as of 09 Dec 2023 03:54  Patient On (Oxygen Delivery Method): room air        CAPILLARY BLOOD GLUCOSE          I&O's Detail        Physical Exam:  General: NAD, resting comfortably in bed  HEENT: Normocephalic atraumatic  Respiratory: Nonlabored respirations  Cardio: regular rate  Abdomen: soft, nondistended, appropriately tender, surgical incisions are c/d/i. NGT/OGT*  Vascular: extremities are warm and well perfused      Labs:    12-09    142  |  104  |  20  ----------------------------<  82  4.3   |  26  |  1.60<H>    Ca    9.3      09 Dec 2023 06:43  Phos  3.5     12-09  Mg     2.30     12-09    TPro  6.9  /  Alb  3.8  /  TBili  0.4  /  DBili  x   /  AST  26  /  ALT  24  /  AlkPhos  101  12-09    LIVER FUNCTIONS - ( 09 Dec 2023 06:43 )  Alb: 3.8 g/dL / Pro: 6.9 g/dL / ALK PHOS: 101 U/L / ALT: 24 U/L / AST: 26 U/L / GGT: x                                 12.1   8.43  )-----------( 165      ( 09 Dec 2023 06:43 )             38.1     PT/INR - ( 09 Dec 2023 06:43 )   PT: 13.7 sec;   INR: 1.22 ratio         PTT - ( 09 Dec 2023 06:43 )  PTT:37.4 sec     Surgery Progress Note     Subjective:  Patient seen and examined on AM rounds. Denies BRBPR overnight, no nonbloody BMs yet either. Patient endorses      Vital Signs:  Vital Signs Last 24 Hrs  T(C): 36.6 (09 Dec 2023 03:54), Max: 37.4 (08 Dec 2023 17:16)  T(F): 97.9 (09 Dec 2023 03:54), Max: 99.4 (08 Dec 2023 17:16)  HR: 54 (09 Dec 2023 03:54) (54 - 64)  BP: 112/56 (09 Dec 2023 03:54) (108/48 - 119/61)  BP(mean): --  RR: 18 (09 Dec 2023 03:54) (16 - 18)  SpO2: 100% (09 Dec 2023 03:54) (97% - 100%)    Parameters below as of 09 Dec 2023 03:54  Patient On (Oxygen Delivery Method): room air        CAPILLARY BLOOD GLUCOSE          I&O's Detail        Physical Exam:  General: NAD, resting comfortably in bed  HEENT: Normocephalic atraumatic  Respiratory: Nonlabored respirations  Cardio: regular rate  Abdomen: soft, nondistended, nontender  Anus: fluctuance and tenderness at posterior anal verge with liquid feculent drainage  Vascular: extremities are warm and well perfused      Labs:    12-09    142  |  104  |  20  ----------------------------<  82  4.3   |  26  |  1.60<H>    Ca    9.3      09 Dec 2023 06:43  Phos  3.5     12-09  Mg     2.30     12-09    TPro  6.9  /  Alb  3.8  /  TBili  0.4  /  DBili  x   /  AST  26  /  ALT  24  /  AlkPhos  101  12-09    LIVER FUNCTIONS - ( 09 Dec 2023 06:43 )  Alb: 3.8 g/dL / Pro: 6.9 g/dL / ALK PHOS: 101 U/L / ALT: 24 U/L / AST: 26 U/L / GGT: x                                 12.1   8.43  )-----------( 165      ( 09 Dec 2023 06:43 )             38.1     PT/INR - ( 09 Dec 2023 06:43 )   PT: 13.7 sec;   INR: 1.22 ratio         PTT - ( 09 Dec 2023 06:43 )  PTT:37.4 sec

## 2023-12-09 NOTE — PROGRESS NOTE ADULT - PROBLEM SELECTOR PLAN 1
-3.2 cm lesion w/ concern for possible neoplasm vs infection. Possible fistula from rectum to anal area  -appreciate colorectal surgery  -do ceftriaxone 1 gram Q24, flagyl 500 mg Q8  -check CEA  -email sent to GI  -NPO for now  -possible MR vs scope per GI 3.2 cm lesion w/ concern for possible neoplasm vs. infection vs. possible fistula    Plan:   -Colorectal surgery consult, appreciate recommendations  -GI consult, appreciate recommendations  -Ceftriaxone 1 g IV daily (12/9 - ) and Flagyl 500 mg IV q8h (12/9 - )  -MR pelvis ordered, f/u  -NPO for now pending possible intervention  -Monitor Hgb and transfuse <7

## 2023-12-09 NOTE — DISCHARGE NOTE PROVIDER - PROVIDER TOKENS
PROVIDER:[TOKEN:[[id number]]] PROVIDER:[TOKEN:[[id number]]] PROVIDER:[TOKEN:[[id number]]],PROVIDER:[TOKEN:[[id number]]],PROVIDER:[TOKEN:[[id number]]] PROVIDER:[TOKEN:[[id number]]],PROVIDER:[TOKEN:[[id number]]],PROVIDER:[TOKEN:[[id number]]]

## 2023-12-09 NOTE — H&P ADULT - HISTORY OF PRESENT ILLNESS
73 year old M hx of afib on xarelto, systolic CHF, CAD w/ stent, who presents w/ blood in stool and difficulty pooping. He had colonoscopy 20 years ago, and thinks it was normal at this time. He endorses several pounds weight loss 2/2 management of CHF. Denies fevers, chills, nausea, vomiting, diarrhea, sob, headaches, visual changes, LE swelling, or melena. In the ED CT a/p iv contrast w/ ill defined lesion 3.2 cm rectum concern for abscess vs neoplasm. Colorectal sx aware. ROS otherwise negative.

## 2023-12-09 NOTE — PROGRESS NOTE ADULT - ASSESSMENT
74y/o M w/ PMHx afib on xarelto, systolic CHF, CAD s/p PCI (on Plavix), who presented w/ BRBPR, difficulty pooping, and sensation of a rectal lump. CT w/ ill defined lesion 3.2 cm rectum concern for abscess vs neoplasm with possible fistula. CEA mildly elevated to 4.3, leukocytosis resolved.    Recommendations:   - GI consulted - f/u MRI, possible flex sig vs cscope next week  - Will attempt bedside I&D today  - Continue abx  - Monitor H/H, transfuse as needed  - Colorectal surgery will follow      A Team Surgery  [id number]     74y/o M w/ PMHx afib on xarelto, systolic CHF, CAD s/p PCI (on Plavix), who presented w/ BRBPR, difficulty pooping, and sensation of a rectal lump. CT w/ ill defined lesion 3.2 cm rectum concern for abscess vs neoplasm with possible fistula. CEA mildly elevated to 4.3, leukocytosis resolved.    Recommendations:   - GI consulted - f/u MRI, possible flex sig vs cscope next week  - Will attempt bedside I&D today  - Continue abx  - Monitor H/H, transfuse as needed  - Colorectal surgery will follow      A Team Surgery  [id number]

## 2023-12-09 NOTE — ED ADULT NURSE REASSESSMENT NOTE - NSFALLRISKINTERV_ED_ALL_ED
Assistance OOB with selected safe patient handling equipment if applicable/Assistance with ambulation/Communicate fall risk and risk factors to all staff, patient, and family/Monitor gait and stability/Provide visual cue: yellow wristband, yellow gown, etc/Reinforce activity limits and safety measures with patient and family/Call bell, personal items and telephone in reach/Instruct patient to call for assistance before getting out of bed/chair/stretcher/Non-slip footwear applied when patient is off stretcher/[place] to call system/Physically safe environment - no spills, clutter or unnecessary equipment/Purposeful Proactive Rounding/Room/bathroom lighting operational, light cord in reach Assistance OOB with selected safe patient handling equipment if applicable/Assistance with ambulation/Communicate fall risk and risk factors to all staff, patient, and family/Monitor gait and stability/Provide visual cue: yellow wristband, yellow gown, etc/Reinforce activity limits and safety measures with patient and family/Call bell, personal items and telephone in reach/Instruct patient to call for assistance before getting out of bed/chair/stretcher/Non-slip footwear applied when patient is off stretcher/[place] to call system/Physically safe environment - no spills, clutter or unnecessary equipment/Purposeful Proactive Rounding/Room/bathroom lighting operational, light cord in reach

## 2023-12-09 NOTE — PROGRESS NOTE ADULT - PROBLEM SELECTOR PLAN 5
F-none  E-replete  N-NPO in case of procedure, DASH/TLC w/ 1 L restriction if diet is ordered  SCD for DVT prophylaxis Diet: NPO in case of procedure, DASH/TLC w/ 1 L fluid restriction after  DVT ppx: SCDs, holding AC iso rectal bleeding and possible procedures  Disposition: pending course

## 2023-12-09 NOTE — CONSULT NOTE ADULT - ASSESSMENT
1. Hematochezia  2. Rectal lesion  3. Weight loss  4. CAD s/p PCI (on Plavix)  5. Afib (on Xarelto)  6. HFrEF , no echo on file  Hgb 13.2>12.1. PLT normal. INR 1.45>1.22. Initial labs appear concentrated.   Pt hemodynamically stable  CT A/P: A 3.2 cm ill-defined hypoattenuating lesion in the posterior aspect of the rectum in the mid to distal anal canal with possible fistula extending to the skin surface of the left intergluteal cleft. Associated surrounding perineal subcutaneous fat stranding. Differential includes a perianal fistula with phlegmon/early developing abscess. However, underlying lesion needs to be ruled out. Recommend further evaluation with pelvic MR with perianal fistula protocol.  Reports normal colonoscopy 20 years ago    Recommendations:  - F/u pelvic MR with perianal fistula protocol  - Monitor for signs of ongoing bleeding  - Trend CBC, maintain active T&S and transfuse to goal hgb > 7 g/dL  - Will consider colonoscopy or flexible sigmoidoscopy for evaluation early next week pending clinical course  - F/u Echocardiogram    1. Rectal lesion  2. Hematochezia, likely rectal outlet bleeding  3. Weight loss  4. CAD s/p PCI (on Plavix)  5. Afib (on Xarelto)  6. HFrEF , no echo on file  Hgb 13.2>12.1. PLT normal. INR 1.45>1.22. Initial labs appear concentrated.   Pt hemodynamically stable  CT A/P: A 3.2 cm ill-defined hypoattenuating lesion in the posterior aspect of the rectum in the mid to distal anal canal with possible fistula extending to the skin surface of the left intergluteal cleft. Associated surrounding perineal subcutaneous fat stranding. Differential includes a perianal fistula with phlegmon/early developing abscess. However, underlying lesion needs to be ruled out. Recommend further evaluation with pelvic MR with perianal fistula protocol.  Reports normal colonoscopy 20 years ago    Recommendations:  - F/u pelvic MR with perianal fistula protocol  - Monitor for signs of ongoing bleeding  - Trend CBC, maintain active T&S and transfuse to goal hgb > 7 g/dL  - Will consider colonoscopy or flexible sigmoidoscopy for evaluation early next week pending clinical course  - F/u Echocardiogram   - CRS to attempt bedside I&D    Recommendations preliminary until signed by attending.     Chencho Mueller MD  Gastroenterology/Hepatology Fellow  Available via [employer] Teams    NON-URGENT CONSULTS:  Please email [email or web address]  AT NIGHT AND ON WEEKENDS:  Contact on-call GI fellow via answering service ([phone]) from 5pm-8am and on weekends/holidays  MONDAY-FRIDAY 8AM-5PM:  Pager# [phone] ([facility]) or [phone] ([facility])   1. Rectal lesion  2. Hematochezia, likely rectal outlet bleeding  3. Weight loss  4. CAD s/p PCI (on Plavix)  5. Afib (on Xarelto)  6. HFrEF , no echo on file  Hgb 13.2>12.1. PLT normal. INR 1.45>1.22. Initial labs appear concentrated.   Pt hemodynamically stable  CT A/P: A 3.2 cm ill-defined hypoattenuating lesion in the posterior aspect of the rectum in the mid to distal anal canal with possible fistula extending to the skin surface of the left intergluteal cleft. Associated surrounding perineal subcutaneous fat stranding. Differential includes a perianal fistula with phlegmon/early developing abscess. However, underlying lesion needs to be ruled out. Recommend further evaluation with pelvic MR with perianal fistula protocol.  Reports normal colonoscopy 20 years ago    Recommendations:  - F/u pelvic MR with perianal fistula protocol  - Monitor for signs of ongoing bleeding  - Trend CBC, maintain active T&S and transfuse to goal hgb > 7 g/dL  - Will consider colonoscopy or flexible sigmoidoscopy for evaluation early next week pending clinical course  - F/u Echocardiogram   - CRS to attempt bedside I&D    Recommendations preliminary until signed by attending.     Chencho Mueller MD  Gastroenterology/Hepatology Fellow  Available via [employer] Teams    NON-URGENT CONSULTS:  Please email [email or web address]  AT NIGHT AND ON WEEKENDS:  Contact on-call GI fellow via answering service ([phone]) from 5pm-8am and on weekends/holidays  MONDAY-FRIDAY 8AM-5PM:  Pager# [phone] ([facility]) or [phone] ([facility])   1. Rectal lesion  2. Hematochezia, likely rectal outlet bleeding  3. Weight loss  4. CAD s/p PCI (on Plavix)  5. Afib (on Xarelto)  6. HFrEF , no echo on file  Hgb 13.2>12.1. PLT normal. INR 1.45>1.22. Initial labs appear concentrated.   Pt hemodynamically stable  CT A/P: A 3.2 cm ill-defined hypoattenuating lesion in the posterior aspect of the rectum in the mid to distal anal canal with possible fistula extending to the skin surface of the left intergluteal cleft. Associated surrounding perineal subcutaneous fat stranding. Differential includes a perianal fistula with phlegmon/early developing abscess. However, underlying lesion needs to be ruled out. Recommend further evaluation with pelvic MR with perianal fistula protocol.  Reports normal colonoscopy 20 years ago    7. Pancreatic lesion  CT demonstrates A 0.4 cm hypoattenuating focus in the distal pancreatic body (2/38). No main pancreatic ductal dilatation  DDx includes cyst vs malignancy. Will require MRI for further characterization    Recommendations:  - F/u pelvic MR with perianal fistula protocol  - Monitor for signs of ongoing bleeding  - Trend CBC, maintain active T&S and transfuse to goal hgb > 7 g/dL  - Will consider colonoscopy or flexible sigmoidoscopy for evaluation early next week pending clinical course  - F/u Echocardiogram   - CRS to attempt bedside I&D  - Non-urgent MRI Abdomen to better characterize pancreatic lesion    Recommendations preliminary until signed by attending.     Chencho Mueller MD  Gastroenterology/Hepatology Fellow  Available via Microsoft Teams    NON-URGENT CONSULTS:  Please email [email or web address]  [email or web address]  AT NIGHT AND ON WEEKENDS:  Contact on-call GI fellow via answering service ([phone]) from 5pm-8am and on weekends/holidays  MONDAY-FRIDAY 8AM-5PM:  Pager# [phone] ([facility]) or [phone] ([facility])   1. Rectal lesion  2. Hematochezia, likely rectal outlet bleeding  3. Weight loss  4. CAD s/p PCI (on Plavix)  5. Afib (on Xarelto)  6. HFrEF , no echo on file  Hgb 13.2>12.1. PLT normal. INR 1.45>1.22. Initial labs appear concentrated.   Pt hemodynamically stable  CT A/P: A 3.2 cm ill-defined hypoattenuating lesion in the posterior aspect of the rectum in the mid to distal anal canal with possible fistula extending to the skin surface of the left intergluteal cleft. Associated surrounding perineal subcutaneous fat stranding. Differential includes a perianal fistula with phlegmon/early developing abscess. However, underlying lesion needs to be ruled out. Recommend further evaluation with pelvic MR with perianal fistula protocol.  Reports normal colonoscopy 20 years ago    7. Pancreatic lesion  CT demonstrates A 0.4 cm hypoattenuating focus in the distal pancreatic body (2/38). No main pancreatic ductal dilatation  DDx includes cyst vs malignancy. Will require MRI for further characterization    Recommendations:  - F/u pelvic MR with perianal fistula protocol  - Monitor for signs of ongoing bleeding  - Trend CBC, maintain active T&S and transfuse to goal hgb > 7 g/dL  - Will consider colonoscopy or flexible sigmoidoscopy for evaluation early next week pending clinical course  - F/u Echocardiogram   - CRS to attempt bedside I&D  - Non-urgent MRI Abdomen to better characterize pancreatic lesion    Recommendations preliminary until signed by attending.     Chencho Mueller MD  Gastroenterology/Hepatology Fellow  Available via [employer] Teams    NON-URGENT CONSULTS:  Please email [email or web address]  [email or web address]  AT NIGHT AND ON WEEKENDS:  Contact on-call GI fellow via answering service ([phone]) from 5pm-8am and on weekends/holidays  MONDAY-FRIDAY 8AM-5PM:  Pager# [phone] ([facility]) or [phone] ([facility])

## 2023-12-09 NOTE — CONSULT NOTE ADULT - SUBJECTIVE AND OBJECTIVE BOX
Chief Complaint:  hematochezia    HPI:    73 year old male with afib on xarelto, systolic CHF, CAD s/p PCI (on Plavix), who presented w/ blood in stool and difficulty pooping. He had a colonoscopy 20 years ago, and thinks it was normal at this time. He endorses several pounds weight loss "2/2 management of CHF". Denies fevers, chills, nausea, vomiting, diarrhea, sob, headaches, visual changes, LE swelling, or melena. In the ED CT a/p iv contrast w/ ill defined lesion 3.2 cm rectum concern for abscess vs neoplasm. Colorectal sx aware. ROS otherwise negative.     Allergies:  No Known Allergies    Hospital Medications:  acetaminophen     Tablet .. 650 milliGRAM(s) Oral every 6 hours PRN  aMIOdarone    Tablet 200 milliGRAM(s) Oral daily  atorvastatin 80 milliGRAM(s) Oral at bedtime  cefTRIAXone   IVPB 1000 milliGRAM(s) IV Intermittent every 24 hours  metoprolol tartrate 12.5 milliGRAM(s) Oral every 12 hours  metroNIDAZOLE  IVPB      metroNIDAZOLE  IVPB 500 milliGRAM(s) IV Intermittent every 8 hours      PMHX/PSHX:  Hypertension    CAD (coronary artery disease)    Hyperlipidemia    Congestive heart failure (CHF)    Atrial fibrillation    History of cardiac cath    AICD (automatic cardioverter/defibrillator) present    Family history:  Family history of pancreatic cancer    Family history of heart disease in brother    Social History: no smoking    ROS:   See HPI    PHYSICAL EXAM:   GENERAL:  NAD, Appears stated age  HEENT:  NC/AT,  conjunctivae clear and pink, sclera -anicteric  CHEST:  CTA B/L, Normal effort  HEART:  RRR S1/S2,  ABDOMEN:  Soft, non-tender, non-distended,  no masses   EXTREMITIES:  No cyanosis or Edema  SKIN:  Warm & Dry. No rash or erythema  NEURO:  Alert, oriented, no focal deficit    Vital Signs:  Vital Signs Last 24 Hrs  T(C): 36.6 (09 Dec 2023 03:54), Max: 37.4 (08 Dec 2023 17:16)  T(F): 97.9 (09 Dec 2023 03:54), Max: 99.4 (08 Dec 2023 17:16)  HR: 54 (09 Dec 2023 03:54) (54 - 64)  BP: 112/56 (09 Dec 2023 03:54) (108/48 - 119/61)  BP(mean): --  RR: 18 (09 Dec 2023 03:54) (16 - 18)  SpO2: 100% (09 Dec 2023 03:54) (97% - 100%)    Parameters below as of 09 Dec 2023 03:54  Patient On (Oxygen Delivery Method): room air      Daily Height in cm: 185.42 (09 Dec 2023 03:54)    Daily Weight in k.5 (09 Dec 2023 03:54)    LABS:                        12.1   8.43  )-----------( 165      ( 09 Dec 2023 06:43 )             38.1     Mean Cell Volume: 91.8 fL (- @ 06:43)        142  |  104  |  20  ----------------------------<  82  4.3   |  26  |  1.60<H>    Ca    9.3      09 Dec 2023 06:43  Phos  3.5       Mg     2.30         TPro  6.9  /  Alb  3.8  /  TBili  0.4  /  DBili  x   /  AST  26  /  ALT  24  /  AlkPhos  101      LIVER FUNCTIONS - ( 09 Dec 2023 06:43 )  Alb: 3.8 g/dL / Pro: 6.9 g/dL / ALK PHOS: 101 U/L / ALT: 24 U/L / AST: 26 U/L / GGT: x           PT/INR - ( 09 Dec 2023 06:43 )   PT: 13.7 sec;   INR: 1.22 ratio         PTT - ( 09 Dec 2023 06:43 )  PTT:37.4 sec  Urinalysis Basic - ( 09 Dec 2023 06:43 )    Color: x / Appearance: x / SG: x / pH: x  Gluc: 82 mg/dL / Ketone: x  / Bili: x / Urobili: x   Blood: x / Protein: x / Nitrite: x   Leuk Esterase: x / RBC: x / WBC x   Sq Epi: x / Non Sq Epi: x / Bacteria: x                              12.1   8.43  )-----------( 165      ( 09 Dec 2023 06:43 )             38.1                         13.2   12.23 )-----------( 180      ( 08 Dec 2023 13:51 )             40.8     Imaging:    < from: CT Abdomen and Pelvis w/ IV Cont (23 @ 15:34) >  LIVER: Within normal limits.  BILE DUCTS: Normal caliber.  GALLBLADDER: Within normal limits.  SPLEEN: Within normal limits.  PANCREAS: A 0.4 cm hypoattenuating focus in the distal pancreatic body   (2/38). No main pancreatic ductal dilatation.  ADRENALS: Within normal limits.  KIDNEYS/URETERS: Right renal cysts and additional bilateral renal   subcentimeter hypodense foci, too small to characterize. Symmetric renal   enhancement.    BLADDER:Within normal limits.  REPRODUCTIVE ORGANS: Mild prostate enlargement. Unremarkable seminal   vesicles.    BOWEL: No bowel obstruction. There is a 3.2 cm ill-defined   hyperattenuating lesion in the posterior aspect of the rectum in the mid   to distal anal canal with possible fistula extending to the skin surface   of the left intergluteal cleft. There is surrounding perineal   subcutaneous fat stranding.  PERITONEUM: No ascites.  VESSELS: Atherosclerotic changes.  RETROPERITONEUM/LYMPH NODES: No lymphadenopathy.  ABDOMINAL WALL: Within normal limits.  BONES: Within normal limits.    < end of copied text >     Chief Complaint:  hematochezia    HPI:    73 year old male with afib on xarelto, HFrEF, CAD s/p PCI (on Plavix), who presented w/ rectal pain, constipation and hematochezia. He had a colonoscopy 20 years ago, and thinks it was normal at this time. He endorses several pounds weight loss "2/2 management of CHF". Denies fevers, chills, nausea, vomiting, diarrhea, sob, headaches, visual changes, LE swelling, or melena. In the ED CT a/p iv contrast w/ ill defined lesion 3.2 cm rectum concern for abscess vs neoplasm. Colorectal sx aware. ROS otherwise negative.     Allergies:  No Known Allergies    Hospital Medications:  acetaminophen     Tablet .. 650 milliGRAM(s) Oral every 6 hours PRN  aMIOdarone    Tablet 200 milliGRAM(s) Oral daily  atorvastatin 80 milliGRAM(s) Oral at bedtime  cefTRIAXone   IVPB 1000 milliGRAM(s) IV Intermittent every 24 hours  metoprolol tartrate 12.5 milliGRAM(s) Oral every 12 hours  metroNIDAZOLE  IVPB      metroNIDAZOLE  IVPB 500 milliGRAM(s) IV Intermittent every 8 hours      PMHX/PSHX:  Hypertension    CAD (coronary artery disease)    Hyperlipidemia    Congestive heart failure (CHF)    Atrial fibrillation    History of cardiac cath    AICD (automatic cardioverter/defibrillator) present    Family history:  Family history of pancreatic cancer    Family history of heart disease in brother    Social History: no smoking    ROS:   See HPI    PHYSICAL EXAM:   GENERAL:  NAD, Appears stated age  HEENT:  sclera -anicteric  CHEST:   Normal effort  HEART:  HDS  ABDOMEN:  Soft, non-tender, non-distended  RECTAL: deferred; other service at bedside    SKIN:  Warm & Dry.   NEURO:  Alert, oriented, no focal deficit    Vital Signs:  Vital Signs Last 24 Hrs  T(C): 36.6 (09 Dec 2023 03:54), Max: 37.4 (08 Dec 2023 17:16)  T(F): 97.9 (09 Dec 2023 03:54), Max: 99.4 (08 Dec 2023 17:16)  HR: 54 (09 Dec 2023 03:54) (54 - 64)  BP: 112/56 (09 Dec 2023 03:54) (108/48 - 119/61)  BP(mean): --  RR: 18 (09 Dec 2023 03:54) (16 - 18)  SpO2: 100% (09 Dec 2023 03:54) (97% - 100%)    Parameters below as of 09 Dec 2023 03:54  Patient On (Oxygen Delivery Method): room air      Daily Height in cm: 185.42 (09 Dec 2023 03:54)    Daily Weight in k.5 (09 Dec 2023 03:54)    LABS:                        12.1   8.43  )-----------( 165      ( 09 Dec 2023 06:43 )             38.1     Mean Cell Volume: 91.8 fL (23 @ 06:43)        142  |  104  |  20  ----------------------------<  82  4.3   |  26  |  1.60<H>    Ca    9.3      09 Dec 2023 06:43  Phos  3.5       Mg     2.30         TPro  6.9  /  Alb  3.8  /  TBili  0.4  /  DBili  x   /  AST  26  /  ALT  24  /  AlkPhos  101  12    LIVER FUNCTIONS - ( 09 Dec 2023 06:43 )  Alb: 3.8 g/dL / Pro: 6.9 g/dL / ALK PHOS: 101 U/L / ALT: 24 U/L / AST: 26 U/L / GGT: x           PT/INR - ( 09 Dec 2023 06:43 )   PT: 13.7 sec;   INR: 1.22 ratio         PTT - ( 09 Dec 2023 06:43 )  PTT:37.4 sec  Urinalysis Basic - ( 09 Dec 2023 06:43 )    Color: x / Appearance: x / SG: x / pH: x  Gluc: 82 mg/dL / Ketone: x  / Bili: x / Urobili: x   Blood: x / Protein: x / Nitrite: x   Leuk Esterase: x / RBC: x / WBC x   Sq Epi: x / Non Sq Epi: x / Bacteria: x                              12.1   8.43  )-----------( 165      ( 09 Dec 2023 06:43 )             38.1                         13.2   12.23 )-----------( 180      ( 08 Dec 2023 13:51 )             40.8     Imaging:    < from: CT Abdomen and Pelvis w/ IV Cont (23 @ 15:34) >  LIVER: Within normal limits.  BILE DUCTS: Normal caliber.  GALLBLADDER: Within normal limits.  SPLEEN: Within normal limits.  PANCREAS: A 0.4 cm hypoattenuating focus in the distal pancreatic body   (2/38). No main pancreatic ductal dilatation.  ADRENALS: Within normal limits.  KIDNEYS/URETERS: Right renal cysts and additional bilateral renal   subcentimeter hypodense foci, too small to characterize. Symmetric renal   enhancement.    BLADDER:Within normal limits.  REPRODUCTIVE ORGANS: Mild prostate enlargement. Unremarkable seminal   vesicles.    BOWEL: No bowel obstruction. There is a 3.2 cm ill-defined   hyperattenuating lesion in the posterior aspect of the rectum in the mid   to distal anal canal with possible fistula extending to the skin surface   of the left intergluteal cleft. There is surrounding perineal   subcutaneous fat stranding.  PERITONEUM: No ascites.  VESSELS: Atherosclerotic changes.  RETROPERITONEUM/LYMPH NODES: No lymphadenopathy.  ABDOMINAL WALL: Within normal limits.  BONES: Within normal limits.    < end of copied text >

## 2023-12-09 NOTE — PROGRESS NOTE ADULT - ATTENDING COMMENTS
BRBPR d/t rectal lesion, abscess vs malignancy, c/w empiric Ceftriaxone/Flagyl, MRI pelvis, colorectal to attempt I&D, GI to perform sigmoidoscopy vs colonoscopy next week   Chronic Afib, c/w Amiodarone and Metoprolol, hold Xarelto d/t BRBPR  CAD, hold Plavix d/t BRBPR, c/w Coreg/statin   Chronic HFrEF, c/w Coreg, holding Lasix

## 2023-12-09 NOTE — H&P ADULT - PROBLEM SELECTOR PLAN 1
-3.2 cm lesion w/ concern for possible neoplasm vs infection. Possible fistula from rectum to anal area  -appreciate colorectal surgery  -do ceftriaxone 1 gram Q24, flagyl 500 mg Q8  -check CEA  -email sent to GI  -NPO for now  -possible MR vs scope per GI

## 2023-12-09 NOTE — PROGRESS NOTE ADULT - ASSESSMENT
73 year old M hx of afib on xarelto, systolic CHF, CAD w/ stent, who presents w/ blood in stool and difficulty pooping. He had colonoscopy 20 years ago, and thinks it was normal at this time. 73 M w/ PMH of afib on Xarelto, systolic CHF, and CAD w/ stent, who presents w/ blood in stool and difficulty moving bowels, with lesion on imaging concerning for neoplasm vs. abscess vs. fistula.

## 2023-12-09 NOTE — DISCHARGE NOTE PROVIDER - NSDCCPTREATMENT_GEN_ALL_CORE_FT
PRINCIPAL PROCEDURE  Procedure: CT abdomen  Findings and Treatment: IMPRESSION:  A 3.2 cm ill-defined hypoattenuating lesion in the posterior aspect of   the rectum in the mid to distal anal canal with possible fistula   extending to the skin surface of the left intergluteal cleft. Associated   surrounding perineal subcutaneous fat stranding. Differential includes a   perianal fistula with phlegmon/early developing abscess. However,   underlying lesion needs to be ruled out.Recommend further evaluation   with pelvic MR with perianal fistula protocol.  < from: CT Abdomen and Pelvis w/ IV Cont (12.08.23 @ 15:34) >

## 2023-12-09 NOTE — DISCHARGE NOTE PROVIDER - NSDCMRMEDTOKEN_GEN_ALL_CORE_FT
amiodarone 200 mg oral tablet: 1 tab(s) orally once a day  carvedilol 12.5 mg oral tablet: 1 tab(s) orally 2 times a day  empagliflozin 10 mg oral tablet: 1 tab(s) orally once a day  Entresto 24 mg-26 mg oral tablet: 1 tab(s) orally 2 times a day  furosemide 40 mg oral tablet: 1 tab(s) orally once a day  Lipitor 80 mg oral tablet: 1 tab(s) orally once a day  Plavix 75 mg oral tablet: 1 tab(s) orally once a day   amiodarone 200 mg oral tablet: 1 tab(s) orally once a day  amoxicillin-clavulanate 875 mg-125 mg oral tablet: 1 tab(s) orally 3 times a day  carvedilol 12.5 mg oral tablet: 1 tab(s) orally 2 times a day  empagliflozin 10 mg oral tablet: 1 tab(s) orally once a day  Entresto 24 mg-26 mg oral tablet: 1 tab(s) orally 2 times a day  furosemide 40 mg oral tablet: 1 tab(s) orally once a day  Lipitor 80 mg oral tablet: 1 tab(s) orally once a day  Plavix 75 mg oral tablet: 1 tab(s) orally once a day

## 2023-12-09 NOTE — PROGRESS NOTE ADULT - PROBLEM SELECTOR PLAN 3
-as above  -c/w amiodarone 200 mg  -hold AC Plan:  -As above  -Hold AC iso BRBPR  -Continue amiodarone 200 mg

## 2023-12-09 NOTE — CHART NOTE - NSCHARTNOTEFT_GEN_A_CORE
From [employer], information for patient's ICD in case of possible MRI:        +--------------------------------------------------------------------------------+------+------------------+--------------------------------+-------------------+-----------------------+---------------------------------+  | Implanted                                                                      | Type | Area             |                    | Device Identifier | Shelf Expiration Date | Model / Serial / Lot            |  +================================================================================+======+==================+================================+===================+=======================+=================================+  | Medtronic [id number] Amplia Mri Quad Crt-D [id number]                             | ICD  |                  | [employer]                      |                   |                       | [id number] AMPLIA MRI QUAD CRT-D / |  | Implanted: 09/24/2018 (Quantity not on file)                                   |      |                  |                                |                   |                       | [id number] /                    |  +--------------------------------------------------------------------------------+------+------------------+--------------------------------+-------------------+-----------------------+---------------------------------+  | Defibrillator Cardiac Quad Crt-D Df-4 Claria Mri ([id number]) - [id number] -     | ICD  | Left: Chest Wall | [employer] CARDIAC SURGERY TECH |                   | 09/28/2023            | [id number] /                       |  | [id number]                                                                     |      |                  |                                |                   |                       | [id number] /                    |  | Implanted: Qty: 1 on 12/02/2022 by Sameer Qiu MD at [facility]  |      |                  |                                |                   |                       | [id number]                      |  | HOSPITAL                                                                       |      |                  |                                |                   |                       |                                 |  +--------------------------------------------------------------------------------+------+------------------+--------------------------------+-------------------+-----------------------+---------------------------------+  | Envelope Resorbable Full Large For Icd Aigisrx Right ([id number]) - [id number] -   | Mesh | Left: Chest Wall | [employer] CARDIAC SURGERY TECH |                   | 08/03/2023            | [id number] /                      |  | [id number]                                                                     |      |                  |                                |                   |                       | [id number] /                       |  | Implanted: Qty: 1 on 12/02/2022 by Sameer Qiu MD at [facility]  |      |                  |                                |                   |                       | [id number]                         |  | HOSPITAL                                                                       |      |                  |                                |                   |                       |                                 |  +--------------------------------------------------------------------------------+------+------------------+--------------------------------+--- From [employer], information for patient's ICD in case of possible MRI:        +--------------------------------------------------------------------------------+------+------------------+--------------------------------+-------------------+-----------------------+---------------------------------+  | Implanted                                                                      | Type | Area             |                    | Device Identifier | Shelf Expiration Date | Model / Serial / Lot            |  +================================================================================+======+==================+================================+===================+=======================+=================================+  | Medtronic [id number] Amplia Mri Quad Crt-D [id number]                             | ICD  |                  | [employer]                      |                   |                       | [id number] AMPLIA MRI QUAD CRT-D / |  | Implanted: 09/24/2018 (Quantity not on file)                                   |      |                  |                                |                   |                       | [id number] /                    |  +--------------------------------------------------------------------------------+------+------------------+--------------------------------+-------------------+-----------------------+---------------------------------+  | Defibrillator Cardiac Quad Crt-D Df-4 Claria Mri ([id number]) - [id number] -     | ICD  | Left: Chest Wall | [employer] CARDIAC SURGERY TECH |                   | 09/28/2023            | [id number] /                       |  | [id number]                                                                     |      |                  |                                |                   |                       | [id number] /                    |  | Implanted: Qty: 1 on 12/02/2022 by Sameer Qiu MD at [facility]  |      |                  |                                |                   |                       | [id number]                      |  | HOSPITAL                                                                       |      |                  |                                |                   |                       |                                 |  +--------------------------------------------------------------------------------+------+------------------+--------------------------------+-------------------+-----------------------+---------------------------------+  | Envelope Resorbable Full Large For Icd Aigisrx Right ([id number]) - [id number] -   | Mesh | Left: Chest Wall | [employer] CARDIAC SURGERY TECH |                   | 08/03/2023            | [id number] /                      |  | [id number]                                                                     |      |                  |                                |                   |                       | [id number] /                       |  | Implanted: Qty: 1 on 12/02/2022 by Sameer Qiu MD at [facility]  |      |                  |                                |                   |                       | [id number]                         |  | HOSPITAL                                                                       |      |                  |                                |                   |                       |                                 |  +--------------------------------------------------------------------------------+------+------------------+--------------------------------+---

## 2023-12-09 NOTE — H&P ADULT - PROBLEM SELECTOR PLAN 2
-hold carvedilol for now, can do lopressor 12.5 mg BID as this has less effect on BP  -c/w lipitor 80 mg  -hold plavix since stent has been >1 year  -mets >4, should not hold up procedure (RCRI score 2), can give lasix if overloaded or SOB 40 mg iv push

## 2023-12-09 NOTE — PROVIDER CONTACT NOTE (OTHER) - NAME OF MD/NP/PA/DO NOTIFIED:
Dr Glynn [facility] aware Dr Glynn [facility] aware

## 2023-12-09 NOTE — PROGRESS NOTE ADULT - PROBLEM SELECTOR PLAN 2
-hold carvedilol for now, can do lopressor 12.5 mg BID as this has less effect on BP  -c/w lipitor 80 mg  -hold plavix since stent has been >1 year  -mets >4, should not hold up procedure (RCRI score 2), can give lasix if overloaded or SOB 40 mg iv push Plan:  -Hold carvedilol for now  -Continue Lopressor 12.5 mg BID as this has less effect on BP  -Continue Lipitor 80 mg  -Hold Plavix iso BRBPR and since stent was >1 year ago  -METS>4, should not hold up procedure (RCRI score 2)  -Can give Lasix if overloaded or SOB (40 mg IV)

## 2023-12-10 LAB
ANION GAP SERPL CALC-SCNC: 10 MMOL/L — SIGNIFICANT CHANGE UP (ref 7–14)
ANION GAP SERPL CALC-SCNC: 10 MMOL/L — SIGNIFICANT CHANGE UP (ref 7–14)
ANISOCYTOSIS BLD QL: SLIGHT — SIGNIFICANT CHANGE UP
ANISOCYTOSIS BLD QL: SLIGHT — SIGNIFICANT CHANGE UP
BASOPHILS # BLD AUTO: 0.03 K/UL — SIGNIFICANT CHANGE UP (ref 0–0.2)
BASOPHILS # BLD AUTO: 0.03 K/UL — SIGNIFICANT CHANGE UP (ref 0–0.2)
BASOPHILS NFR BLD AUTO: 0.5 % — SIGNIFICANT CHANGE UP (ref 0–2)
BASOPHILS NFR BLD AUTO: 0.5 % — SIGNIFICANT CHANGE UP (ref 0–2)
BUN SERPL-MCNC: 24 MG/DL — HIGH (ref 7–23)
BUN SERPL-MCNC: 24 MG/DL — HIGH (ref 7–23)
BURR CELLS BLD QL SMEAR: PRESENT — SIGNIFICANT CHANGE UP
BURR CELLS BLD QL SMEAR: PRESENT — SIGNIFICANT CHANGE UP
CALCIUM SERPL-MCNC: 9 MG/DL — SIGNIFICANT CHANGE UP (ref 8.4–10.5)
CALCIUM SERPL-MCNC: 9 MG/DL — SIGNIFICANT CHANGE UP (ref 8.4–10.5)
CHLORIDE SERPL-SCNC: 104 MMOL/L — SIGNIFICANT CHANGE UP (ref 98–107)
CHLORIDE SERPL-SCNC: 104 MMOL/L — SIGNIFICANT CHANGE UP (ref 98–107)
CO2 SERPL-SCNC: 25 MMOL/L — SIGNIFICANT CHANGE UP (ref 22–31)
CO2 SERPL-SCNC: 25 MMOL/L — SIGNIFICANT CHANGE UP (ref 22–31)
CREAT SERPL-MCNC: 1.52 MG/DL — HIGH (ref 0.5–1.3)
CREAT SERPL-MCNC: 1.52 MG/DL — HIGH (ref 0.5–1.3)
EGFR: 48 ML/MIN/1.73M2 — LOW
EGFR: 48 ML/MIN/1.73M2 — LOW
EOSINOPHIL # BLD AUTO: 0.22 K/UL — SIGNIFICANT CHANGE UP (ref 0–0.5)
EOSINOPHIL # BLD AUTO: 0.22 K/UL — SIGNIFICANT CHANGE UP (ref 0–0.5)
EOSINOPHIL NFR BLD AUTO: 3.7 % — SIGNIFICANT CHANGE UP (ref 0–6)
EOSINOPHIL NFR BLD AUTO: 3.7 % — SIGNIFICANT CHANGE UP (ref 0–6)
GIANT PLATELETS BLD QL SMEAR: PRESENT — SIGNIFICANT CHANGE UP
GIANT PLATELETS BLD QL SMEAR: PRESENT — SIGNIFICANT CHANGE UP
GLUCOSE SERPL-MCNC: 90 MG/DL — SIGNIFICANT CHANGE UP (ref 70–99)
GLUCOSE SERPL-MCNC: 90 MG/DL — SIGNIFICANT CHANGE UP (ref 70–99)
HCT VFR BLD CALC: 35.6 % — LOW (ref 39–50)
HCT VFR BLD CALC: 35.6 % — LOW (ref 39–50)
HGB BLD-MCNC: 11.5 G/DL — LOW (ref 13–17)
HGB BLD-MCNC: 11.5 G/DL — LOW (ref 13–17)
IANC: 4.29 K/UL — SIGNIFICANT CHANGE UP (ref 1.8–7.4)
IANC: 4.29 K/UL — SIGNIFICANT CHANGE UP (ref 1.8–7.4)
IMM GRANULOCYTES NFR BLD AUTO: 0.5 % — SIGNIFICANT CHANGE UP (ref 0–0.9)
IMM GRANULOCYTES NFR BLD AUTO: 0.5 % — SIGNIFICANT CHANGE UP (ref 0–0.9)
LYMPHOCYTES # BLD AUTO: 0.78 K/UL — LOW (ref 1–3.3)
LYMPHOCYTES # BLD AUTO: 0.78 K/UL — LOW (ref 1–3.3)
LYMPHOCYTES # BLD AUTO: 13 % — SIGNIFICANT CHANGE UP (ref 13–44)
LYMPHOCYTES # BLD AUTO: 13 % — SIGNIFICANT CHANGE UP (ref 13–44)
MACROCYTES BLD QL: SLIGHT — SIGNIFICANT CHANGE UP
MACROCYTES BLD QL: SLIGHT — SIGNIFICANT CHANGE UP
MAGNESIUM SERPL-MCNC: 2.3 MG/DL — SIGNIFICANT CHANGE UP (ref 1.6–2.6)
MAGNESIUM SERPL-MCNC: 2.3 MG/DL — SIGNIFICANT CHANGE UP (ref 1.6–2.6)
MCHC RBC-ENTMCNC: 29.2 PG — SIGNIFICANT CHANGE UP (ref 27–34)
MCHC RBC-ENTMCNC: 29.2 PG — SIGNIFICANT CHANGE UP (ref 27–34)
MCHC RBC-ENTMCNC: 32.3 GM/DL — SIGNIFICANT CHANGE UP (ref 32–36)
MCHC RBC-ENTMCNC: 32.3 GM/DL — SIGNIFICANT CHANGE UP (ref 32–36)
MCV RBC AUTO: 90.4 FL — SIGNIFICANT CHANGE UP (ref 80–100)
MCV RBC AUTO: 90.4 FL — SIGNIFICANT CHANGE UP (ref 80–100)
MONOCYTES # BLD AUTO: 0.65 K/UL — SIGNIFICANT CHANGE UP (ref 0–0.9)
MONOCYTES # BLD AUTO: 0.65 K/UL — SIGNIFICANT CHANGE UP (ref 0–0.9)
MONOCYTES NFR BLD AUTO: 10.8 % — SIGNIFICANT CHANGE UP (ref 2–14)
MONOCYTES NFR BLD AUTO: 10.8 % — SIGNIFICANT CHANGE UP (ref 2–14)
MYELOCYTES NFR BLD: 0.9 % — HIGH (ref 0–0)
MYELOCYTES NFR BLD: 0.9 % — HIGH (ref 0–0)
NEUTROPHILS # BLD AUTO: 4.29 K/UL — SIGNIFICANT CHANGE UP (ref 1.8–7.4)
NEUTROPHILS # BLD AUTO: 4.29 K/UL — SIGNIFICANT CHANGE UP (ref 1.8–7.4)
NEUTROPHILS NFR BLD AUTO: 71.5 % — SIGNIFICANT CHANGE UP (ref 43–77)
NEUTROPHILS NFR BLD AUTO: 71.5 % — SIGNIFICANT CHANGE UP (ref 43–77)
NRBC # BLD: 0 /100 WBCS — SIGNIFICANT CHANGE UP (ref 0–0)
NRBC # BLD: 0 /100 WBCS — SIGNIFICANT CHANGE UP (ref 0–0)
NRBC # FLD: 0 K/UL — SIGNIFICANT CHANGE UP (ref 0–0)
NRBC # FLD: 0 K/UL — SIGNIFICANT CHANGE UP (ref 0–0)
PHOSPHATE SERPL-MCNC: 3.8 MG/DL — SIGNIFICANT CHANGE UP (ref 2.5–4.5)
PHOSPHATE SERPL-MCNC: 3.8 MG/DL — SIGNIFICANT CHANGE UP (ref 2.5–4.5)
PLAT MORPH BLD: NORMAL — SIGNIFICANT CHANGE UP
PLAT MORPH BLD: NORMAL — SIGNIFICANT CHANGE UP
PLATELET # BLD AUTO: 119 K/UL — LOW (ref 150–400)
PLATELET # BLD AUTO: 119 K/UL — LOW (ref 150–400)
PLATELET COUNT - ESTIMATE: ABNORMAL
PLATELET COUNT - ESTIMATE: ABNORMAL
POIKILOCYTOSIS BLD QL AUTO: SIGNIFICANT CHANGE UP
POIKILOCYTOSIS BLD QL AUTO: SIGNIFICANT CHANGE UP
POTASSIUM SERPL-MCNC: 4.2 MMOL/L — SIGNIFICANT CHANGE UP (ref 3.5–5.3)
POTASSIUM SERPL-MCNC: 4.2 MMOL/L — SIGNIFICANT CHANGE UP (ref 3.5–5.3)
POTASSIUM SERPL-SCNC: 4.2 MMOL/L — SIGNIFICANT CHANGE UP (ref 3.5–5.3)
POTASSIUM SERPL-SCNC: 4.2 MMOL/L — SIGNIFICANT CHANGE UP (ref 3.5–5.3)
RBC # BLD: 3.94 M/UL — LOW (ref 4.2–5.8)
RBC # BLD: 3.94 M/UL — LOW (ref 4.2–5.8)
RBC # FLD: 14.8 % — HIGH (ref 10.3–14.5)
RBC # FLD: 14.8 % — HIGH (ref 10.3–14.5)
RBC BLD AUTO: NORMAL — SIGNIFICANT CHANGE UP
RBC BLD AUTO: NORMAL — SIGNIFICANT CHANGE UP
SODIUM SERPL-SCNC: 139 MMOL/L — SIGNIFICANT CHANGE UP (ref 135–145)
SODIUM SERPL-SCNC: 139 MMOL/L — SIGNIFICANT CHANGE UP (ref 135–145)
WBC # BLD: 6 K/UL — SIGNIFICANT CHANGE UP (ref 3.8–10.5)
WBC # BLD: 6 K/UL — SIGNIFICANT CHANGE UP (ref 3.8–10.5)
WBC # FLD AUTO: 6 K/UL — SIGNIFICANT CHANGE UP (ref 3.8–10.5)
WBC # FLD AUTO: 6 K/UL — SIGNIFICANT CHANGE UP (ref 3.8–10.5)

## 2023-12-10 PROCEDURE — 99232 SBSQ HOSP IP/OBS MODERATE 35: CPT | Mod: GC

## 2023-12-10 RX ORDER — CLOPIDOGREL BISULFATE 75 MG/1
75 TABLET, FILM COATED ORAL DAILY
Refills: 0 | Status: DISCONTINUED | OUTPATIENT
Start: 2023-12-10 | End: 2023-12-11

## 2023-12-10 RX ADMIN — CARVEDILOL PHOSPHATE 12.5 MILLIGRAM(S): 80 CAPSULE, EXTENDED RELEASE ORAL at 08:02

## 2023-12-10 RX ADMIN — Medication 100 MILLIGRAM(S): at 15:25

## 2023-12-10 RX ADMIN — Medication 100 MILLIGRAM(S): at 08:02

## 2023-12-10 RX ADMIN — ATORVASTATIN CALCIUM 80 MILLIGRAM(S): 80 TABLET, FILM COATED ORAL at 21:43

## 2023-12-10 RX ADMIN — CLOPIDOGREL BISULFATE 75 MILLIGRAM(S): 75 TABLET, FILM COATED ORAL at 12:17

## 2023-12-10 RX ADMIN — CEFTRIAXONE 100 MILLIGRAM(S): 500 INJECTION, POWDER, FOR SOLUTION INTRAMUSCULAR; INTRAVENOUS at 23:16

## 2023-12-10 NOTE — PROGRESS NOTE ADULT - ATTENDING COMMENTS
BRBPR d/t rectal lesion, abscess vs malignancy, s/p bedside I&D w/ blood expressed, c/w empiric Ceftriaxone/Flagyl, awaiting MRI pelvis, f/up wound culture, GI to consider sigmoidoscopy vs colonoscopy next week   Chronic Afib, c/w Amiodarone/Coreg, hold Xarelto d/t BRBPR  CAD, restart Plavix, c/w Coreg/statin   Chronic HFrEF, c/w Coreg, holding Lasix

## 2023-12-10 NOTE — PROGRESS NOTE ADULT - ASSESSMENT
73 M w/ PMH of afib on Xarelto, systolic CHF, and CAD w/ stent, who presents w/ blood in stool and difficulty moving bowels, with lesion on imaging concerning for neoplasm vs. abscess vs. fistula.

## 2023-12-10 NOTE — PROGRESS NOTE ADULT - PROBLEM SELECTOR PLAN 2
Plan:  -Hold carvedilol for now  -Continue Lopressor 12.5 mg BID as this has less effect on BP  -Continue Lipitor 80 mg  -Hold Plavix iso BRBPR and since stent was >1 year ago  -METS>4, should not hold up procedure (RCRI score 2)  -Can give Lasix if overloaded or SOB (40 mg IV) Plan:  -restarted coreg and plavix  -Continue Lopressor 12.5 mg BID as this has less effect on BP  -Continue Lipitor 80 mg  -Hold Plavix iso BRBPR and since stent was >1 year ago  -METS>4, should not hold up procedure (RCRI score 2)  -Can give Lasix if overloaded or SOB (40 mg IV)

## 2023-12-10 NOTE — PROGRESS NOTE ADULT - SUBJECTIVE AND OBJECTIVE BOX
***************************************************************  Chencho Martinez, PGY3  Internal Medicine   NS pager: [phone]  [facility] pager: [phone]  ***************************************************************        CHERRY FAJARDO  73y  Male      Patient is a 73y old  Male who presents with a chief complaint of Rectal lesion (09 Dec 2023 12:44)      INTERVAL HPI/OVERNIGHT EVENTS:       FAMILY HISTORY:  Family history of pancreatic cancer  Father  from pancreatic cancer    Family history of heart disease in brother      T(C): 36.4 (12-10-23 @ 05:18), Max: 36.9 (23 @ 14:01)  HR: 52 (12-10-23 @ 05:18) (52 - 58)  BP: 120/56 (12-10-23 @ 05:18) (108/48 - 120/56)  RR: 19 (12-10-23 @ 05:18) (18 - 19)  SpO2: 100% (12-10-23 @ 05:18) (97% - 100%)  Wt(kg): --Vital Signs Last 24 Hrs  T(C): 36.4 (10 Dec 2023 05:18), Max: 36.9 (09 Dec 2023 14:01)  T(F): 97.6 (10 Dec 2023 05:18), Max: 98.4 (09 Dec 2023 14:01)  HR: 52 (10 Dec 2023 05:18) (52 - 58)  BP: 120/56 (10 Dec 2023 05:18) (108/48 - 120/56)  BP(mean): --  RR: 19 (10 Dec 2023 05:18) (18 - 19)  SpO2: 100% (10 Dec 2023 05:18) (97% - 100%)    Parameters below as of 10 Dec 2023 05:18  Patient On (Oxygen Delivery Method): room air      No Known Allergies      PHYSICAL EXAM:  GENERAL: NAD, laying comfortably in bed  HEAD:  Atraumatic, Normocephalic  EYES: EOMI, PERRLA, conjunctiva and sclera clear  ENMT: No tonsillar erythema, exudates, or enlargement; Moist mucous membranes  NECK: Supple, No JVD  NERVOUS SYSTEM:  Alert & Oriented X3, Good concentration; Motor Strength grossly intact in B/L upper and lower extremities;   CHEST/LUNG: Clear to auscultation bilaterally; No rales, rhonchi, wheezing, or rubs  HEART: Regular rate and rhythm; No murmurs, rubs, or gallops  ABDOMEN: Soft, Nontender, Nondistended; Bowel sounds present  EXTREMITIES:  No clubbing, cyanosis, or edema  LYMPH: No lymphadenopathy noted  SKIN: No rashes or lesions        LABS:                            12.1   8.43  )-----------( 165      ( 09 Dec 2023 06:43 )             38.1           142  |  104  |  20  ----------------------------<  82  4.3   |  26  |  1.60<H>    Ca    9.3      09 Dec 2023 06:43  Phos  3.5       Mg     2.30         TPro  6.9  /  Alb  3.8  /  TBili  0.4  /  DBili  x   /  AST  26  /  ALT  24  /  AlkPhos  101                Urinalysis Basic - ( 09 Dec 2023 06:43 )    Color: x / Appearance: x / SG: x / pH: x  Gluc: 82 mg/dL / Ketone: x  / Bili: x / Urobili: x   Blood: x / Protein: x / Nitrite: x   Leuk Esterase: x / RBC: x / WBC x   Sq Epi: x / Non Sq Epi: x / Bacteria: x        PT/INR - ( 09 Dec 2023 06:43 )   PT: 13.7 sec;   INR: 1.22 ratio         PTT - ( 09 Dec 2023 06:43 )  PTT:37.4 sec          CAPILLARY BLOOD GLUCOSE                    RADIOLOGY & ADDITIONAL TESTS:      acetaminophen     Tablet .. 650 milliGRAM(s) Oral every 6 hours PRN  aMIOdarone    Tablet 200 milliGRAM(s) Oral daily  atorvastatin 80 milliGRAM(s) Oral at bedtime  carvedilol 12.5 milliGRAM(s) Oral every 12 hours  cefTRIAXone   IVPB 1000 milliGRAM(s) IV Intermittent every 24 hours  lidocaine 1% Injectable 20 milliLiter(s) Local Injection once  metroNIDAZOLE  IVPB      metroNIDAZOLE  IVPB 500 milliGRAM(s) IV Intermittent every 8 hours      HEALTH ISSUES - PROBLEM Dx:  Rectal lesion    CAD (coronary artery disease)    Chronic atrial fibrillation    Chronic systolic congestive heart failure    Nutrition, metabolism, and development symptoms    Need for prophylactic measure           ***************************************************************  Chencho Martinez, PGY3  Internal Medicine   NS pager: [phone]  [facility] pager: [phone]  ***************************************************************        CHERRY FAJARDO  73y  Male      Patient is a 73y old  Male who presents with a chief complaint of Rectal lesion (09 Dec 2023 12:44)      INTERVAL HPI/OVERNIGHT EVENTS:       FAMILY HISTORY:  Family history of pancreatic cancer  Father  from pancreatic cancer    Family history of heart disease in brother      T(C): 36.4 (12-10-23 @ 05:18), Max: 36.9 (23 @ 14:01)  HR: 52 (12-10-23 @ 05:18) (52 - 58)  BP: 120/56 (12-10-23 @ 05:18) (108/48 - 120/56)  RR: 19 (12-10-23 @ 05:18) (18 - 19)  SpO2: 100% (12-10-23 @ 05:18) (97% - 100%)  Wt(kg): --Vital Signs Last 24 Hrs  T(C): 36.4 (10 Dec 2023 05:18), Max: 36.9 (09 Dec 2023 14:01)  T(F): 97.6 (10 Dec 2023 05:18), Max: 98.4 (09 Dec 2023 14:01)  HR: 52 (10 Dec 2023 05:18) (52 - 58)  BP: 120/56 (10 Dec 2023 05:18) (108/48 - 120/56)  BP(mean): --  RR: 19 (10 Dec 2023 05:18) (18 - 19)  SpO2: 100% (10 Dec 2023 05:18) (97% - 100%)    Parameters below as of 10 Dec 2023 05:18  Patient On (Oxygen Delivery Method): room air      No Known Allergies      PHYSICAL EXAM:  GENERAL: NAD, laying comfortably in bed  HEAD:  Atraumatic, Normocephalic  EYES: EOMI, PERRLA, conjunctiva and sclera clear  ENMT: No tonsillar erythema, exudates, or enlargement; Moist mucous membranes  NECK: Supple, No JVD  NERVOUS SYSTEM:  Alert & Oriented X3, Good concentration; Motor Strength grossly intact in B/L upper and lower extremities;   CHEST/LUNG: Clear to auscultation bilaterally; No rales, rhonchi, wheezing, or rubs  HEART: Regular rate and rhythm; No murmurs, rubs, or gallops  ABDOMEN: Soft, Nontender, Nondistended; Bowel sounds present  EXTREMITIES:  No clubbing, cyanosis, or edema  LYMPH: No lymphadenopathy noted  SKIN: No rashes or lesions        LABS:                            12.1   8.43  )-----------( 165      ( 09 Dec 2023 06:43 )             38.1           142  |  104  |  20  ----------------------------<  82  4.3   |  26  |  1.60<H>    Ca    9.3      09 Dec 2023 06:43  Phos  3.5       Mg     2.30         TPro  6.9  /  Alb  3.8  /  TBili  0.4  /  DBili  x   /  AST  26  /  ALT  24  /  AlkPhos  101                Urinalysis Basic - ( 09 Dec 2023 06:43 )    Color: x / Appearance: x / SG: x / pH: x  Gluc: 82 mg/dL / Ketone: x  / Bili: x / Urobili: x   Blood: x / Protein: x / Nitrite: x   Leuk Esterase: x / RBC: x / WBC x   Sq Epi: x / Non Sq Epi: x / Bacteria: x        PT/INR - ( 09 Dec 2023 06:43 )   PT: 13.7 sec;   INR: 1.22 ratio         PTT - ( 09 Dec 2023 06:43 )  PTT:37.4 sec          CAPILLARY BLOOD GLUCOSE                    RADIOLOGY & ADDITIONAL TESTS:      acetaminophen     Tablet .. 650 milliGRAM(s) Oral every 6 hours PRN  aMIOdarone    Tablet 200 milliGRAM(s) Oral daily  atorvastatin 80 milliGRAM(s) Oral at bedtime  carvedilol 12.5 milliGRAM(s) Oral every 12 hours  cefTRIAXone   IVPB 1000 milliGRAM(s) IV Intermittent every 24 hours  lidocaine 1% Injectable 20 milliLiter(s) Local Injection once  metroNIDAZOLE  IVPB      metroNIDAZOLE  IVPB 500 milliGRAM(s) IV Intermittent every 8 hours      HEALTH ISSUES - PROBLEM Dx:  Rectal lesion    CAD (coronary artery disease)    Chronic atrial fibrillation    Chronic systolic congestive heart failure    Nutrition, metabolism, and development symptoms    Need for prophylactic measure           ***************************************************************  Chencho Martinez, PGY3  Internal Medicine   NS pager: [phone]  [facility] pager: [phone]  ***************************************************************        CHERRY FAJARDO  73y  Male      Patient is a 73y old  Male who presents with a chief complaint of Rectal lesion (09 Dec 2023 12:44)      INTERVAL HPI/OVERNIGHT EVENTS: No acute events overnight. At bedside, pt had no complaints. Denied fever, chills, CP, SOB, nausea, vomiting, diarrhea, constipation, dysuria.       FAMILY HISTORY:  Family history of pancreatic cancer  Father  from pancreatic cancer    Family history of heart disease in brother      T(C): 36.4 (12-10-23 @ 05:18), Max: 36.9 (23 @ 14:01)  HR: 52 (12-10-23 @ 05:18) (52 - 58)  BP: 120/56 (12-10-23 @ 05:18) (108/48 - 120/56)  RR: 19 (12-10-23 @ 05:18) (18 - 19)  SpO2: 100% (12-10-23 @ 05:18) (97% - 100%)  Wt(kg): --Vital Signs Last 24 Hrs  T(C): 36.4 (10 Dec 2023 05:18), Max: 36.9 (09 Dec 2023 14:01)  T(F): 97.6 (10 Dec 2023 05:18), Max: 98.4 (09 Dec 2023 14:01)  HR: 52 (10 Dec 2023 05:18) (52 - 58)  BP: 120/56 (10 Dec 2023 05:18) (108/48 - 120/56)  BP(mean): --  RR: 19 (10 Dec 2023 05:18) (18 - 19)  SpO2: 100% (10 Dec 2023 05:18) (97% - 100%)    Parameters below as of 10 Dec 2023 05:18  Patient On (Oxygen Delivery Method): room air      No Known Allergies      PHYSICAL EXAM:  GENERAL: NAD, laying comfortably in bed  HEAD:  Atraumatic, Normocephalic  EYES: EOMI, PERRLA, conjunctiva and sclera clear  ENMT: No tonsillar erythema, exudates, or enlargement; Moist mucous membranes  NECK: Supple, No JVD  NERVOUS SYSTEM:  Alert & Oriented X3, Good concentration; Motor Strength grossly intact in B/L upper and lower extremities;   CHEST/LUNG: Clear to auscultation bilaterally; No rales, rhonchi, wheezing, or rubs  HEART: Regular rate and rhythm; No murmurs, rubs, or gallops  ABDOMEN: Soft, Nontender, Nondistended; Bowel sounds present  EXTREMITIES:  No clubbing, cyanosis, or edema  LYMPH: No lymphadenopathy noted  SKIN: No rashes or lesions        LABS:                            12.1   8.43  )-----------( 165      ( 09 Dec 2023 06:43 )             38.1       12    142  |  104  |  20  ----------------------------<  82  4.3   |  26  |  1.60<H>    Ca    9.3      09 Dec 2023 06:43  Phos  3.5       Mg     2.30         TPro  6.9  /  Alb  3.8  /  TBili  0.4  /  DBili  x   /  AST  26  /  ALT  24  /  AlkPhos  101                Urinalysis Basic - ( 09 Dec 2023 06:43 )    Color: x / Appearance: x / SG: x / pH: x  Gluc: 82 mg/dL / Ketone: x  / Bili: x / Urobili: x   Blood: x / Protein: x / Nitrite: x   Leuk Esterase: x / RBC: x / WBC x   Sq Epi: x / Non Sq Epi: x / Bacteria: x        PT/INR - ( 09 Dec 2023 06:43 )   PT: 13.7 sec;   INR: 1.22 ratio         PTT - ( 09 Dec 2023 06:43 )  PTT:37.4 sec          CAPILLARY BLOOD GLUCOSE                    RADIOLOGY & ADDITIONAL TESTS:      acetaminophen     Tablet .. 650 milliGRAM(s) Oral every 6 hours PRN  aMIOdarone    Tablet 200 milliGRAM(s) Oral daily  atorvastatin 80 milliGRAM(s) Oral at bedtime  carvedilol 12.5 milliGRAM(s) Oral every 12 hours  cefTRIAXone   IVPB 1000 milliGRAM(s) IV Intermittent every 24 hours  lidocaine 1% Injectable 20 milliLiter(s) Local Injection once  metroNIDAZOLE  IVPB      metroNIDAZOLE  IVPB 500 milliGRAM(s) IV Intermittent every 8 hours      HEALTH ISSUES - PROBLEM Dx:  Rectal lesion    CAD (coronary artery disease)    Chronic atrial fibrillation    Chronic systolic congestive heart failure    Nutrition, metabolism, and development symptoms    Need for prophylactic measure           ***************************************************************  Chencho Martinez, PGY3  Internal Medicine   NS pager: [phone]  [facility] pager: [phone]  ***************************************************************        CHERRY FAJARDO  73y  Male      Patient is a 73y old  Male who presents with a chief complaint of Rectal lesion (09 Dec 2023 12:44)      INTERVAL HPI/OVERNIGHT EVENTS: No acute events overnight. At bedside, pt had no complaints. Denied fever, chills, CP, SOB, nausea, vomiting, diarrhea, constipation, dysuria.       FAMILY HISTORY:  Family history of pancreatic cancer  Father  from pancreatic cancer    Family history of heart disease in brother      T(C): 36.4 (12-10-23 @ 05:18), Max: 36.9 (23 @ 14:01)  HR: 52 (12-10-23 @ 05:18) (52 - 58)  BP: 120/56 (12-10-23 @ 05:18) (108/48 - 120/56)  RR: 19 (12-10-23 @ 05:18) (18 - 19)  SpO2: 100% (12-10-23 @ 05:18) (97% - 100%)  Wt(kg): --Vital Signs Last 24 Hrs  T(C): 36.4 (10 Dec 2023 05:18), Max: 36.9 (09 Dec 2023 14:01)  T(F): 97.6 (10 Dec 2023 05:18), Max: 98.4 (09 Dec 2023 14:01)  HR: 52 (10 Dec 2023 05:18) (52 - 58)  BP: 120/56 (10 Dec 2023 05:18) (108/48 - 120/56)  BP(mean): --  RR: 19 (10 Dec 2023 05:18) (18 - 19)  SpO2: 100% (10 Dec 2023 05:18) (97% - 100%)    Parameters below as of 10 Dec 2023 05:18  Patient On (Oxygen Delivery Method): room air      No Known Allergies      PHYSICAL EXAM:  GENERAL: NAD, laying comfortably in bed  HEAD:  Atraumatic, Normocephalic  EYES: EOMI, PERRLA, conjunctiva and sclera clear  ENMT: No tonsillar erythema, exudates, or enlargement; Moist mucous membranes  NECK: Supple, No JVD  NERVOUS SYSTEM:  Alert & Oriented X3, Good concentration; Motor Strength grossly intact in B/L upper and lower extremities;   CHEST/LUNG: Clear to auscultation bilaterally; No rales, rhonchi, wheezing, or rubs  HEART: Regular rate and rhythm; No murmurs, rubs, or gallops  ABDOMEN: Soft, Nontender, Nondistended; Bowel sounds present  EXTREMITIES:  No clubbing, cyanosis, or edema  LYMPH: No lymphadenopathy noted  SKIN: No rashes or lesions        LABS:                            12.1   8.43  )-----------( 165      ( 09 Dec 2023 06:43 )             38.1       12    142  |  104  |  20  ----------------------------<  82  4.3   |  26  |  1.60<H>    Ca    9.3      09 Dec 2023 06:43  Phos  3.5       Mg     2.30         TPro  6.9  /  Alb  3.8  /  TBili  0.4  /  DBili  x   /  AST  26  /  ALT  24  /  AlkPhos  101                Urinalysis Basic - ( 09 Dec 2023 06:43 )    Color: x / Appearance: x / SG: x / pH: x  Gluc: 82 mg/dL / Ketone: x  / Bili: x / Urobili: x   Blood: x / Protein: x / Nitrite: x   Leuk Esterase: x / RBC: x / WBC x   Sq Epi: x / Non Sq Epi: x / Bacteria: x        PT/INR - ( 09 Dec 2023 06:43 )   PT: 13.7 sec;   INR: 1.22 ratio         PTT - ( 09 Dec 2023 06:43 )  PTT:37.4 sec          CAPILLARY BLOOD GLUCOSE                    RADIOLOGY & ADDITIONAL TESTS:      acetaminophen     Tablet .. 650 milliGRAM(s) Oral every 6 hours PRN  aMIOdarone    Tablet 200 milliGRAM(s) Oral daily  atorvastatin 80 milliGRAM(s) Oral at bedtime  carvedilol 12.5 milliGRAM(s) Oral every 12 hours  cefTRIAXone   IVPB 1000 milliGRAM(s) IV Intermittent every 24 hours  lidocaine 1% Injectable 20 milliLiter(s) Local Injection once  metroNIDAZOLE  IVPB      metroNIDAZOLE  IVPB 500 milliGRAM(s) IV Intermittent every 8 hours      HEALTH ISSUES - PROBLEM Dx:  Rectal lesion    CAD (coronary artery disease)    Chronic atrial fibrillation    Chronic systolic congestive heart failure    Nutrition, metabolism, and development symptoms    Need for prophylactic measure

## 2023-12-10 NOTE — PROGRESS NOTE ADULT - PROBLEM SELECTOR PLAN 4
S/p 1 L IV fluids    Plan:  -Hold further fluids unless patient becomes hypotensive  -Lasix PRN as above

## 2023-12-10 NOTE — PROGRESS NOTE ADULT - PROBLEM SELECTOR PLAN 1
3.2 cm lesion w/ concern for possible neoplasm vs. infection vs. possible fistula    Plan:   -Colorectal surgery consult, appreciate recommendations  -GI consult, appreciate recommendations  -Ceftriaxone 1 g IV daily (12/9 - ) and Flagyl 500 mg IV q8h (12/9 - )  -MR pelvis ordered, f/u  -NPO for now pending possible intervention  -Monitor Hgb and transfuse <7 3.2 cm lesion w/ concern for possible neoplasm vs. infection vs. possible fistula    Plan:   -Colorectal surgery consult, appreciate recommendations  -GI consult, appreciate recommendations  -Ceftriaxone 1 g IV daily (12/9 - ) and Flagyl 500 mg IV q8h (12/9 - )  -MR pelvis ordered, f/u  -Monitor Hgb and transfuse <7

## 2023-12-10 NOTE — PROGRESS NOTE ADULT - PROBLEM SELECTOR PLAN 5
Diet: NPO in case of procedure, DASH/TLC w/ 1 L fluid restriction after  DVT ppx: SCDs, holding AC iso rectal bleeding and possible procedures  Disposition: pending course

## 2023-12-11 PROCEDURE — 99232 SBSQ HOSP IP/OBS MODERATE 35: CPT | Mod: GC

## 2023-12-11 PROCEDURE — 93306 TTE W/DOPPLER COMPLETE: CPT | Mod: 26

## 2023-12-11 PROCEDURE — 99232 SBSQ HOSP IP/OBS MODERATE 35: CPT

## 2023-12-11 RX ORDER — AMIODARONE HYDROCHLORIDE 400 MG/1
200 TABLET ORAL DAILY
Refills: 0 | Status: DISCONTINUED | OUTPATIENT
Start: 2023-12-11 | End: 2023-12-12

## 2023-12-11 RX ORDER — CHLORHEXIDINE GLUCONATE 213 G/1000ML
1 SOLUTION TOPICAL DAILY
Refills: 0 | Status: DISCONTINUED | OUTPATIENT
Start: 2023-12-11 | End: 2023-12-12

## 2023-12-11 RX ORDER — CARVEDILOL PHOSPHATE 80 MG/1
12.5 CAPSULE, EXTENDED RELEASE ORAL EVERY 12 HOURS
Refills: 0 | Status: DISCONTINUED | OUTPATIENT
Start: 2023-12-11 | End: 2023-12-12

## 2023-12-11 RX ADMIN — Medication 100 MILLIGRAM(S): at 00:13

## 2023-12-11 RX ADMIN — CEFTRIAXONE 100 MILLIGRAM(S): 500 INJECTION, POWDER, FOR SOLUTION INTRAMUSCULAR; INTRAVENOUS at 22:43

## 2023-12-11 RX ADMIN — Medication 100 MILLIGRAM(S): at 07:54

## 2023-12-11 RX ADMIN — Medication 100 MILLIGRAM(S): at 23:37

## 2023-12-11 RX ADMIN — ATORVASTATIN CALCIUM 80 MILLIGRAM(S): 80 TABLET, FILM COATED ORAL at 22:43

## 2023-12-11 RX ADMIN — CARVEDILOL PHOSPHATE 12.5 MILLIGRAM(S): 80 CAPSULE, EXTENDED RELEASE ORAL at 17:08

## 2023-12-11 RX ADMIN — Medication 100 MILLIGRAM(S): at 17:06

## 2023-12-11 RX ADMIN — CHLORHEXIDINE GLUCONATE 1 APPLICATION(S): 213 SOLUTION TOPICAL at 17:12

## 2023-12-11 NOTE — PROGRESS NOTE ADULT - PROBLEM SELECTOR PLAN 1
3.2 cm lesion w/ concern for possible neoplasm vs. infection vs. possible fistula    Plan:   -Colorectal surgery consult, appreciate recommendations  -GI consult, appreciate recommendations  -Ceftriaxone 1 g IV daily (12/9 - ) and Flagyl 500 mg IV q8h (12/9 - )  -MR pelvis ordered, f/u  -Monitor Hgb and transfuse <7

## 2023-12-11 NOTE — PROVIDER CONTACT NOTE (OTHER) - SITUATION
Pts BP is 123/56 and HR is 50
Patient axo x4. Patient out of bed self. Patient admitting diagnosis abscess of rectum
Pt's HR is 52
pt admitted abscess of rectum. afternoon vital signs assess and noted out of parameter
pt admitted abscess of rectum. afternoon vital signs assess and noted out of parmeter
pt admitted abscess of rectum. afternoon vital signs assess and noted out of parameter
Pt BP is 112/56 mmhg and HR is 54

## 2023-12-11 NOTE — PROVIDER CONTACT NOTE (OTHER) - ASSESSMENT
pt alert and stable. pt denies dizzness and discomfort
pt alert and stable. pt denies dizziness and discomfort
Patient heart rate 50 beats per minute
Pt BP is 112/56 mmhg and HR is 54. Asymptomatic
pt alert and stable. pt denies dizziness and discomfort
/56, no acute distress, denies chest pain, stable condition.
Pt is otherwise stable and asymptomatic

## 2023-12-11 NOTE — PROGRESS NOTE ADULT - ASSESSMENT
1. Rectal lesion  2. Hematochezia, likely rectal outlet bleeding  3. Weight loss  4. CAD s/p PCI (on Plavix)  5. Afib (on Xarelto)  6. HFrEF , LVER 25-30%  Hgb 13.2>12.1. PLT normal. INR 1.45>1.22. Initial labs appear concentrated.   Pt hemodynamically stable  CT A/P: A 3.2 cm ill-defined hypoattenuating lesion in the posterior aspect of the rectum in the mid to distal anal canal with possible fistula extending to the skin surface of the left intergluteal cleft. Associated surrounding perineal subcutaneous fat stranding. Differential includes a perianal fistula with phlegmon/early developing abscess. However, underlying lesion needs to be ruled out. Recommend further evaluation with pelvic MR with perianal fistula protocol.  s/p I&D by CRS with bloody drainage, no purulence  Reports normal colonoscopy 20 years ago    7. Pancreatic lesion  CT demonstrates A 0.4 cm hypoattenuating focus in the distal pancreatic body (2/38). No main pancreatic ductal dilatation  DDx includes cyst vs malignancy. Will require MRI for further characterization    Recommendations:  - F/u pelvic MR with perianal fistula protocol  - Monitor for signs of ongoing bleeding  - Trend CBC, maintain active T&S and transfuse to goal hgb > 7 g/dL  - Will consider colonoscopy or flexible sigmoidoscopy for evaluation early next week pending clinical course  - Non-urgent MRI Abdomen to better characterize pancreatic lesion    Chencho Mueller MD  Gastroenterology/Hepatology Fellow  Available via [employer] Teams    NON-URGENT CONSULTS:  Please email [email or web address]  AT NIGHT AND ON WEEKENDS:  Contact on-call GI fellow via answering service ([phone]) from 5pm-8am and on weekends/holidays  MONDAY-FRIDAY 8AM-5PM:  Pager# [phone] ([facility]) or [phone] ([facility])   1. Rectal lesion  2. Hematochezia, likely rectal outlet bleeding  3. Weight loss  4. CAD s/p PCI (on Plavix)  5. Afib (on Xarelto)  6. HFrEF , LVER 25-30%  Hgb 13.2>12.1. PLT normal. INR 1.45>1.22. Initial labs appear concentrated.   Pt hemodynamically stable  CT A/P: A 3.2 cm ill-defined hypoattenuating lesion in the posterior aspect of the rectum in the mid to distal anal canal with possible fistula extending to the skin surface of the left intergluteal cleft. Associated surrounding perineal subcutaneous fat stranding. Differential includes a perianal fistula with phlegmon/early developing abscess. However, underlying lesion needs to be ruled out. Recommend further evaluation with pelvic MR with perianal fistula protocol.  s/p I&D by CRS with bloody drainage, no purulence  Reports normal colonoscopy 20 years ago    7. Pancreatic lesion  CT demonstrates A 0.4 cm hypoattenuating focus in the distal pancreatic body (2/38). No main pancreatic ductal dilatation  DDx includes cyst vs malignancy. Will require MRI for further characterization    Recommendations:  - F/u pelvic MR with perianal fistula protocol  - Monitor for signs of ongoing bleeding  - Trend CBC, maintain active T&S and transfuse to goal hgb > 7 g/dL  - Will consider colonoscopy or flexible sigmoidoscopy for evaluation early next week pending clinical course  - Non-urgent MRI Abdomen to better characterize pancreatic lesion    Chencho Mueller MD  Gastroenterology/Hepatology Fellow  Available via [employer] Teams    NON-URGENT CONSULTS:  Please email [email or web address]  AT NIGHT AND ON WEEKENDS:  Contact on-call GI fellow via answering service ([phone]) from 5pm-8am and on weekends/holidays  MONDAY-FRIDAY 8AM-5PM:  Pager# [phone] ([facility]) or [phone] ([facility])

## 2023-12-11 NOTE — PROGRESS NOTE ADULT - SUBJECTIVE AND OBJECTIVE BOX
Interval Events:   No acute events  Pt passed brown stool today without blood  Denies pain     Hospital Medications:  acetaminophen     Tablet .. 650 milliGRAM(s) Oral every 6 hours PRN  aMIOdarone    Tablet 200 milliGRAM(s) Oral daily  atorvastatin 80 milliGRAM(s) Oral at bedtime  carvedilol 12.5 milliGRAM(s) Oral every 12 hours  cefTRIAXone   IVPB 1000 milliGRAM(s) IV Intermittent every 24 hours  lidocaine 1% Injectable 20 milliLiter(s) Local Injection once  metroNIDAZOLE  IVPB      metroNIDAZOLE  IVPB 500 milliGRAM(s) IV Intermittent every 8 hours    PHYSICAL EXAM:   Vital Signs:  Vital Signs Last 24 Hrs  T(C): 36.5 (11 Dec 2023 06:00), Max: 36.6 (10 Dec 2023 14:46)  T(F): 97.7 (11 Dec 2023 06:00), Max: 97.9 (10 Dec 2023 14:46)  HR: 50 (11 Dec 2023 06:00) (48 - 50)  BP: 125/69 (11 Dec 2023 06:00) (105/59 - 125/69)  BP(mean): --  RR: 18 (11 Dec 2023 06:00) (18 - 18)  SpO2: 100% (11 Dec 2023 06:00) (100% - 100%)    Parameters below as of 11 Dec 2023 06:00  Patient On (Oxygen Delivery Method): room air    Daily     GENERAL:  NAD, resting comfortably in bed  HEENT:  sclera anicteric  CHEST:  Normal Effort  HEART:  HDS  ABDOMEN:  Soft, non-tender, non-distended  SKIN:  Warm & Dry. No jaundice  NEURO:  Alert, oriented, no focal deficit    LABS:                        11.5   6.00  )-----------( 119      ( 10 Dec 2023 06:50 )             35.6       12-10    139  |  104  |  24<H>  ----------------------------<  90  4.2   |  25  |  1.52<H>    Ca    9.0      10 Dec 2023 06:50  Phos  3.8     12-10  Mg     2.30     12-10      Urinalysis Basic - ( 10 Dec 2023 06:50 )    Color: x / Appearance: x / SG: x / pH: x  Gluc: 90 mg/dL / Ketone: x  / Bili: x / Urobili: x   Blood: x / Protein: x / Nitrite: x   Leuk Esterase: x / RBC: x / WBC x   Sq Epi: x / Non Sq Epi: x / Bacteria: x                              11.5   6.00  )-----------( 119      ( 10 Dec 2023 06:50 )             35.6                         12.1   8.43  )-----------( 165      ( 09 Dec 2023 06:43 )             38.1                         13.2   12.23 )-----------( 180      ( 08 Dec 2023 13:51 )             40.8

## 2023-12-11 NOTE — PROVIDER CONTACT NOTE (OTHER) - REASON
Pt BP is 112/56 mmhg and HR is 54
pt repeat heart rate 50
Pt's HR is 52
pt heart rate 48
Pts BP is 123/56 and HR is 50
Patient heart rate 50 beats per minute
pt repeat heart rate 50 repeated after an hour

## 2023-12-11 NOTE — PROGRESS NOTE ADULT - ASSESSMENT
74y/o M w/ PMHx afib on xarelto, systolic CHF, CAD s/p PCI (on Plavix), who presented w/ BRBPR, difficulty pooping, and sensation of a rectal lump. CT w/ ill defined lesion 3.2 cm rectum concern for abscess vs neoplasm with possible fistula. CEA mildly elevated to 4.3, leukocytosis resolved. Bedside I&D 12/9 w/ bloody drainage expressed, no purulence.    Recommendations:   - GI consulted - f/u MRI, f/u plan for possible flex sig vs cscope  - Monitor H/H, transfuse as needed  - Colorectal surgery will follow      A Team Surgery  [id number] 74y/o M w/ PMHx afib on xarelto, systolic CHF, CAD s/p PCI (on Plavix), who presented w/ BRBPR, difficulty pooping, and sensation of a rectal lump. CT w/ ill defined lesion 3.2 cm rectum concern for abscess vs neoplasm with possible fistula. CEA mildly elevated to 4.3, leukocytosis resolved. Bedside I&D 12/9 w/ bloody drainage expressed, no purulence.    Recommendations:   - GI consulted - f/u MRI, f/u plan for possible flex sig vs cscope  - Monitor H/H, transfuse as needed  - Colorectal surgery will follow      A Team Surgery  [id number]

## 2023-12-11 NOTE — PROVIDER CONTACT NOTE (OTHER) - DATE AND TIME:
10-Dec-2023 17:45
09-Dec-2023 08:44
10-Dec-2023 15:38
10-Dec-2023 16:00
10-Dec-2023 21:33
10-Dec-2023 05:30
11-Dec-2023 14:35

## 2023-12-11 NOTE — PROGRESS NOTE ADULT - ATTENDING COMMENTS
Assessment/recommendations as noted. Patient feels well-denies any anal pain, abdominal pain or other complaints. Abdomen-soft/nontender/nondistended. Perianal inspection-no perianal tenderness, fluctuance, drainage or indication of fistulous opening. Await MR of the pelvis. Continued followup with colorectal surgery.

## 2023-12-11 NOTE — PROVIDER CONTACT NOTE (OTHER) - BACKGROUND
Pt was admitted for abscess of the rectum
Patient heart rate 50 beats per minute
pt with pmh of CAD, CHF, Atrial fibrillation.
pt with pmh of CAD, CHF, Atrial fibrillation.
Pt was admitted with rectal abscess
Pt was admitted with rectal abscess
pt with pmh of CAD, CHF, Atrial fibrillation.

## 2023-12-11 NOTE — PROGRESS NOTE ADULT - ATTENDING COMMENTS
Jamia Mitchell MD  Medicine Attending  Teams preferred/Pager: [phone]    I have personally seen and examined patient. I discussed the case with Dr. Johnston and agree with findings and plan as detailed per note above.  No issues overnight. Pt is jamie but stably so. has CRT in place. denies cp sob. frustrated about delay in care    BRBPR d/t rectal lesion, abscess vs malignancy, s/p bedside I&D w/ blood expressed, c/w empiric Ceftriaxone/Flagyl, awaiting MRI pelvis, f/up wound culture, GI to consider sigmoidoscopy vs colonoscopy next week. will need to reach out to Pt's cardiologist prior vs consulting cards   Chronic Afib, c/w Amiodarone/Coreg, hold Xarelto d/t BRBPR  CAD, restart Plavix, c/w Coreg/statin   Chronic HFrEF, c/w Coreg, holding Lasix Jamia Mitchell MD  Medicine Attending  Teams preferred/Pager: [phone]    I have personally seen and examined patient. I discussed the case with Dr. Johnston and agree with findings and plan as detailed per note above.  No issues overnight. Pt is jamie but stably so. has CRT in place. denies cp sob. frustrated about delay in care    BRBPR d/t rectal lesion, abscess vs malignancy, s/p bedside I&D w/ blood expressed, c/w empiric Ceftriaxone/Flagyl, awaiting MRI pelvis, f/up wound culture, GI to consider sigmoidoscopy vs colonoscopy next week. will need to reach out to Pt's cardiologist prior vs consulting cards   Chronic Afib, c/w Amiodarone/Coreg, hold Xarelto d/t BRBPR  CAD, restart Plavix, c/w Coreg/statin   Chronic HFrEF, c/w Coreg, holding Lasix

## 2023-12-11 NOTE — PROGRESS NOTE ADULT - PROBLEM SELECTOR PLAN 2
Plan:  -restarted coreg and plavix  -Continue Lopressor 12.5 mg BID as this has less effect on BP  -Continue Lipitor 80 mg  -Hold Plavix iso BRBPR and since stent was >1 year ago  -METS>4, should not hold up procedure (RCRI score 2)  -Can give Lasix if overloaded or SOB (40 mg IV)

## 2023-12-11 NOTE — PROVIDER CONTACT NOTE (OTHER) - RECOMMENDATIONS
Notify MD
continue to monitor and assess. will await MD. order
Hold AM metoprolol and amiodarone
Notify MD, hold amiodarone.
continue to monitor and assess. will await MD. order
Continue to monitor pt
continue to monitor and assess. will await MD. order

## 2023-12-11 NOTE — PROVIDER CONTACT NOTE (OTHER) - ACTION/TREATMENT ORDERED:
MD notified
MD said this is fine, continue to monitor pt, POC ongoing
Amiodarone held, no further treatments.
as per MD. "it appears as though this is his baseline"
as per MD. repeat 10min.
Hold AM metoprolol and amiodarone
as per MD. repeat 60 min.

## 2023-12-11 NOTE — PROGRESS NOTE ADULT - SUBJECTIVE AND OBJECTIVE BOX
Surgery Progress Note     Subjective:  Patient seen and examined on AM rounds. Patient denies abdominal pain, rectal pain, nausea, vomiting, fevers, chills. No more BRBPR since admission, last BM was 1 day ago. Tolerating diet.      Vital Signs:  Vital Signs Last 24 Hrs  T(C): 36.5 (11 Dec 2023 06:00), Max: 36.6 (10 Dec 2023 14:46)  T(F): 97.7 (11 Dec 2023 06:00), Max: 97.9 (10 Dec 2023 14:46)  HR: 50 (11 Dec 2023 06:00) (48 - 50)  BP: 125/69 (11 Dec 2023 06:00) (105/59 - 125/69)  BP(mean): --  RR: 18 (11 Dec 2023 06:00) (18 - 18)  SpO2: 100% (11 Dec 2023 06:00) (100% - 100%)    Parameters below as of 11 Dec 2023 06:00  Patient On (Oxygen Delivery Method): room air        CAPILLARY BLOOD GLUCOSE          I&O's Detail    10 Dec 2023 07:01  -  11 Dec 2023 07:00  --------------------------------------------------------  IN:    IV PiggyBack: 100 mL    Oral Fluid: 700 mL  Total IN: 800 mL    OUT:    Voided (mL): 890 mL  Total OUT: 890 mL    Total NET: -90 mL            Physical Exam:  General: NAD, resting comfortably in bed  HEENT: Normocephalic atraumatic  Respiratory: Nonlabored respirations  Cardio: regular rate  Abdomen: soft, nondistended, nontender  Vascular: extremities are warm and well perfused      Labs:    12-10    139  |  104  |  24<H>  ----------------------------<  90  4.2   |  25  |  1.52<H>    Ca    9.0      10 Dec 2023 06:50  Phos  3.8     12-10  Mg     2.30     12-10                              11.5   6.00  )-----------( 119      ( 10 Dec 2023 06:50 )             35.6

## 2023-12-12 ENCOUNTER — NON-APPOINTMENT (OUTPATIENT)
Age: 73
End: 2023-12-12

## 2023-12-12 VITALS
OXYGEN SATURATION: 100 % | RESPIRATION RATE: 18 BRPM | TEMPERATURE: 98 F | SYSTOLIC BLOOD PRESSURE: 109 MMHG | HEART RATE: 50 BPM | DIASTOLIC BLOOD PRESSURE: 54 MMHG

## 2023-12-12 LAB
ANION GAP SERPL CALC-SCNC: 10 MMOL/L — SIGNIFICANT CHANGE UP (ref 7–14)
ANION GAP SERPL CALC-SCNC: 10 MMOL/L — SIGNIFICANT CHANGE UP (ref 7–14)
BUN SERPL-MCNC: 19 MG/DL — SIGNIFICANT CHANGE UP (ref 7–23)
BUN SERPL-MCNC: 19 MG/DL — SIGNIFICANT CHANGE UP (ref 7–23)
CALCIUM SERPL-MCNC: 8.9 MG/DL — SIGNIFICANT CHANGE UP (ref 8.4–10.5)
CALCIUM SERPL-MCNC: 8.9 MG/DL — SIGNIFICANT CHANGE UP (ref 8.4–10.5)
CHLORIDE SERPL-SCNC: 108 MMOL/L — HIGH (ref 98–107)
CHLORIDE SERPL-SCNC: 108 MMOL/L — HIGH (ref 98–107)
CO2 SERPL-SCNC: 25 MMOL/L — SIGNIFICANT CHANGE UP (ref 22–31)
CO2 SERPL-SCNC: 25 MMOL/L — SIGNIFICANT CHANGE UP (ref 22–31)
CREAT SERPL-MCNC: 1.55 MG/DL — HIGH (ref 0.5–1.3)
CREAT SERPL-MCNC: 1.55 MG/DL — HIGH (ref 0.5–1.3)
EGFR: 47 ML/MIN/1.73M2 — LOW
EGFR: 47 ML/MIN/1.73M2 — LOW
GLUCOSE SERPL-MCNC: 83 MG/DL — SIGNIFICANT CHANGE UP (ref 70–99)
GLUCOSE SERPL-MCNC: 83 MG/DL — SIGNIFICANT CHANGE UP (ref 70–99)
HCT VFR BLD CALC: 37 % — LOW (ref 39–50)
HCT VFR BLD CALC: 37 % — LOW (ref 39–50)
HGB BLD-MCNC: 11.9 G/DL — LOW (ref 13–17)
HGB BLD-MCNC: 11.9 G/DL — LOW (ref 13–17)
MAGNESIUM SERPL-MCNC: 2.2 MG/DL — SIGNIFICANT CHANGE UP (ref 1.6–2.6)
MAGNESIUM SERPL-MCNC: 2.2 MG/DL — SIGNIFICANT CHANGE UP (ref 1.6–2.6)
MCHC RBC-ENTMCNC: 29 PG — SIGNIFICANT CHANGE UP (ref 27–34)
MCHC RBC-ENTMCNC: 29 PG — SIGNIFICANT CHANGE UP (ref 27–34)
MCHC RBC-ENTMCNC: 32.2 GM/DL — SIGNIFICANT CHANGE UP (ref 32–36)
MCHC RBC-ENTMCNC: 32.2 GM/DL — SIGNIFICANT CHANGE UP (ref 32–36)
MCV RBC AUTO: 90.2 FL — SIGNIFICANT CHANGE UP (ref 80–100)
MCV RBC AUTO: 90.2 FL — SIGNIFICANT CHANGE UP (ref 80–100)
MRSA PCR RESULT.: SIGNIFICANT CHANGE UP
MRSA PCR RESULT.: SIGNIFICANT CHANGE UP
NRBC # BLD: 0 /100 WBCS — SIGNIFICANT CHANGE UP (ref 0–0)
NRBC # BLD: 0 /100 WBCS — SIGNIFICANT CHANGE UP (ref 0–0)
NRBC # FLD: 0 K/UL — SIGNIFICANT CHANGE UP (ref 0–0)
NRBC # FLD: 0 K/UL — SIGNIFICANT CHANGE UP (ref 0–0)
PHOSPHATE SERPL-MCNC: 3.2 MG/DL — SIGNIFICANT CHANGE UP (ref 2.5–4.5)
PHOSPHATE SERPL-MCNC: 3.2 MG/DL — SIGNIFICANT CHANGE UP (ref 2.5–4.5)
PLATELET # BLD AUTO: 184 K/UL — SIGNIFICANT CHANGE UP (ref 150–400)
PLATELET # BLD AUTO: 184 K/UL — SIGNIFICANT CHANGE UP (ref 150–400)
POTASSIUM SERPL-MCNC: 4.1 MMOL/L — SIGNIFICANT CHANGE UP (ref 3.5–5.3)
POTASSIUM SERPL-MCNC: 4.1 MMOL/L — SIGNIFICANT CHANGE UP (ref 3.5–5.3)
POTASSIUM SERPL-SCNC: 4.1 MMOL/L — SIGNIFICANT CHANGE UP (ref 3.5–5.3)
POTASSIUM SERPL-SCNC: 4.1 MMOL/L — SIGNIFICANT CHANGE UP (ref 3.5–5.3)
RBC # BLD: 4.1 M/UL — LOW (ref 4.2–5.8)
RBC # BLD: 4.1 M/UL — LOW (ref 4.2–5.8)
RBC # FLD: 14.8 % — HIGH (ref 10.3–14.5)
RBC # FLD: 14.8 % — HIGH (ref 10.3–14.5)
S AUREUS DNA NOSE QL NAA+PROBE: SIGNIFICANT CHANGE UP
S AUREUS DNA NOSE QL NAA+PROBE: SIGNIFICANT CHANGE UP
SODIUM SERPL-SCNC: 143 MMOL/L — SIGNIFICANT CHANGE UP (ref 135–145)
SODIUM SERPL-SCNC: 143 MMOL/L — SIGNIFICANT CHANGE UP (ref 135–145)
WBC # BLD: 5.23 K/UL — SIGNIFICANT CHANGE UP (ref 3.8–10.5)
WBC # BLD: 5.23 K/UL — SIGNIFICANT CHANGE UP (ref 3.8–10.5)
WBC # FLD AUTO: 5.23 K/UL — SIGNIFICANT CHANGE UP (ref 3.8–10.5)
WBC # FLD AUTO: 5.23 K/UL — SIGNIFICANT CHANGE UP (ref 3.8–10.5)

## 2023-12-12 PROCEDURE — 99239 HOSP IP/OBS DSCHRG MGMT >30: CPT

## 2023-12-12 RX ADMIN — Medication 100 MILLIGRAM(S): at 07:20

## 2023-12-12 RX ADMIN — AMIODARONE HYDROCHLORIDE 200 MILLIGRAM(S): 400 TABLET ORAL at 07:17

## 2023-12-12 RX ADMIN — CARVEDILOL PHOSPHATE 12.5 MILLIGRAM(S): 80 CAPSULE, EXTENDED RELEASE ORAL at 07:17

## 2023-12-12 RX ADMIN — CHLORHEXIDINE GLUCONATE 1 APPLICATION(S): 213 SOLUTION TOPICAL at 11:57

## 2023-12-12 NOTE — ADVANCED PRACTICE NURSE CONSULT - REASON FOR CONSULT
Patient seen on skin care rounds after wound care referral received for assessment of skin impairment and recommendations of topical management of perianal wound. Patient is a 73 year with PMHx of CHF, afib on Xarelto last dose 12/07/2023. Patient presents to the ED for BRBPR, found to have rectal mass c/f abscess vs neoplasm. s/p I&D by surgery on 12/9, pending MRI. Chart reviewed: WBC 5.23, H/H 11.9/37, INR 1.22, Platelets 184, BMI 26, dorian 21.

## 2023-12-12 NOTE — PHYSICAL THERAPY INITIAL EVALUATION ADULT - PERTINENT HX OF CURRENT PROBLEM, REHAB EVAL
73 year old male with PMHx of CHF, afib on Xarelto last dose 12/07/2023. Patient presented to the ED for BRBPR, found ot have rectal mass c/f abscess vs neoplasm.

## 2023-12-12 NOTE — PROGRESS NOTE ADULT - SUBJECTIVE AND OBJECTIVE BOX
Surgery Progress Note     Subjective:  Patient seen and examined on AM rounds. Patient denies nausea, vomiting, fevers, chills. Passing gas, having BMs, no BRBPR. Patient does not want to stay longer in the hospital to continue waiting for MRI.      Vital Signs:  Vital Signs Last 24 Hrs  T(C): 36.7 (12 Dec 2023 14:09), Max: 36.7 (11 Dec 2023 21:40)  T(F): 98.1 (12 Dec 2023 14:09), Max: 98.1 (11 Dec 2023 21:40)  HR: 50 (12 Dec 2023 14:09) (50 - 52)  BP: 109/54 (12 Dec 2023 14:09) (109/54 - 137/68)  BP(mean): --  RR: 18 (12 Dec 2023 14:09) (18 - 18)  SpO2: 100% (12 Dec 2023 14:09) (100% - 100%)    Parameters below as of 12 Dec 2023 14:09  Patient On (Oxygen Delivery Method): room air        CAPILLARY BLOOD GLUCOSE          I&O's Detail    11 Dec 2023 07:01  -  12 Dec 2023 07:00  --------------------------------------------------------  IN:    Oral Fluid: 350 mL  Total IN: 350 mL    OUT:    Voided (mL): 1770 mL  Total OUT: 1770 mL    Total NET: -1420 mL      12 Dec 2023 07:01  -  12 Dec 2023 15:26  --------------------------------------------------------  IN:    Oral Fluid: 440 mL  Total IN: 440 mL    OUT:  Total OUT: 0 mL    Total NET: 440 mL            Physical Exam:  General: NAD, resting comfortably in bed  HEENT: Normocephalic atraumatic  Respiratory: Nonlabored respirations  Cardio: regular rate  Abdomen: soft, nondistended, nontender  Vascular: extremities are warm and well perfused      Labs:    12-12    143  |  108<H>  |  19  ----------------------------<  83  4.1   |  25  |  1.55<H>    Ca    8.9      12 Dec 2023 06:30  Phos  3.2     12-12  Mg     2.20     12-12                              11.9   5.23  )-----------( 184      ( 12 Dec 2023 06:30 )             37.0

## 2023-12-12 NOTE — CHART NOTE - NSCHARTNOTEFT_GEN_A_CORE
MRI pelvis, abdomen is still pending. Patient would like to follow up as o/p. He is asymptomatic at this time. Recommend close follow up with GI as o/p, please make sure he has a follow up appt with us within 1-2 weeks.     GI will plan to sign off at this time. Please feel free to reach out to our team with any follow up questions. Please provide patient with Gastroenterology Clinic number to confirm/arrange appointment; [phone] ([facility] at [address] [facility]) or [phone] ([facility] at [address] [place] [address]) or [phone] ([facility] at [address] [facility]).    Thank you for the consult. Please call us back if you have any questions or concerns.     All recommendations are tentative until the note is attested by an attending.     Vikash Rizo, PGY-5  Gastroenterology/Hepatology Fellow  Available on [employer] Teams  [phone] (Short Range Pager)  [phone] (Long Range Pager)    After 5pm, please contact the on-call GI fellow. [phone] MRI pelvis, abdomen is still pending. Patient would like to follow up as o/p. He is asymptomatic at this time. Recommend close follow up with GI as o/p, please make sure he has a follow up appt with us within 1-2 weeks.     GI will plan to sign off at this time. Please feel free to reach out to our team with any follow up questions. Please provide patient with Gastroenterology Clinic number to confirm/arrange appointment; [phone] ([facility] at [facility]) or [phone] ([facility] at [address] [place] [address]) or [phone] ([facility] at [address] [facility]).    Thank you for the consult. Please call us back if you have any questions or concerns.     All recommendations are tentative until the note is attested by an attending.     Vikash Rizo, PGY-5  Gastroenterology/Hepatology Fellow  Available on Microsoft Teams  [phone] (Short Range Pager)  [phone] (Long Range Pager)    After 5pm, please contact the on-call GI fellow. [phone]

## 2023-12-12 NOTE — ADVANCED PRACTICE NURSE CONSULT - RECOMMEDATIONS
Topical recommendations:   ---Gluteal cleft: After cleansing, apply Rene moisture barrier cream daily and PRN.  ---Bilateral heels: apply atrac-tain cream daily and PRN to dry flaky skin,     Plan discussed with patient and primary RN.   Please contact Wound/Ostomy Care Service Line if we can be of further assistance (ext [phone]). Please reconsult if changes to tissue type noted.  Topical recommendations:   ---Gluteal cleft: After cleansing, apply Rene moisture barrier cream daily and PRN.  ---Bilateral heels: apply atrac-tain cream daily and PRN to dry flaky skin,     Plan discussed with patient and primary RN.   Please contact Wound/Ostomy Care Service Line if we can be of further assistance (ext [phone]). Please reconsult if changes to tissue type noted.

## 2023-12-12 NOTE — DISCHARGE NOTE NURSING/CASE MANAGEMENT/SOCIAL WORK - PATIENT PORTAL LINK FT
You can access the FollowMyHealth Patient Portal offered by [facility] by registering at the following website: [email or web address]. By joining [employer]’s FollowMyHealth portal, you will also be able to view your health information using other applications (apps) compatible with our system. You can access the FollowMyHealth Patient Portal offered by [facility] by registering at the following website: [email or web address]. By joining [employer]’s FollowMyHealth portal, you will also be able to view your health information using other applications (apps) compatible with our system.

## 2023-12-12 NOTE — DISCHARGE NOTE NURSING/CASE MANAGEMENT/SOCIAL WORK - NSDCPEFALRISK_GEN_ALL_CORE
For information on Fall & Injury Prevention, visit: [email or web address] OR  [email or web address] OR  https://www.cdc.gov/steadi/patient.html For information on Fall & Injury Prevention, visit: [email or web address] OR  [email or web address]-[email or web address]-injury OR  https://www.cdc.gov/steadi/patient.html

## 2023-12-12 NOTE — PROGRESS NOTE ADULT - PROVIDER SPECIALTY LIST ADULT
Internal Medicine
Colorectal Surgery
Colorectal Surgery
Internal Medicine
Colorectal Surgery
Gastroenterology
Internal Medicine
Internal Medicine

## 2023-12-12 NOTE — PHYSICAL THERAPY INITIAL EVALUATION ADULT - PREDICTED DURATION OF THERAPY (DAYS/WKS), PT EVAL
PT evaluation only; pt is at baseline function and demonstrating independence with mobility therefore will be discharged from PT service

## 2023-12-12 NOTE — ADVANCED PRACTICE NURSE CONSULT - ASSESSMENT
General: A&Ox 4, independent, continent of urine and stool. Skin warm, dry with increased moisture in intertriginous folds, adequate skin turgor. Bilateral heels with dry flaky cracking skin.     Perianal s/p I&D - previous wound now healed presenting as small area of hypopigmentation 0.5cmx0.2cm in gluteal cleft, no drainage no odor, Periwound with increased moisture, no increased warmth, no edema, no induration, no fluctuance no signs or symptoms of overt skin infection.

## 2023-12-12 NOTE — PROGRESS NOTE ADULT - SUBJECTIVE AND OBJECTIVE BOX
************************  Aleta Rivera MD  Internal Medicine PGY-1  ************************    Patient is a 73y old  Male who presents with a chief complaint of Rectal lesion (11 Dec 2023 11:39)    Overnight no events, this morning patient with no acute complaints.Denies CP, SOB, fevers/chills, N/V, or abdominal pain.  Patient urinating well with BM(s).      Patient reminded of ongoing careplan.    MEDICATIONS  (STANDING):  aMIOdarone    Tablet 200 milliGRAM(s) Oral daily  atorvastatin 80 milliGRAM(s) Oral at bedtime  carvedilol 12.5 milliGRAM(s) Oral every 12 hours  cefTRIAXone   IVPB 1000 milliGRAM(s) IV Intermittent every 24 hours  chlorhexidine 2% Cloths 1 Application(s) Topical daily  lidocaine 1% Injectable 20 milliLiter(s) Local Injection once  metroNIDAZOLE  IVPB 500 milliGRAM(s) IV Intermittent every 8 hours  metroNIDAZOLE  IVPB        MEDICATIONS  (PRN):  acetaminophen     Tablet .. 650 milliGRAM(s) Oral every 6 hours PRN Temp greater or equal to 38C (100.4F), Mild Pain (1 - 3)      CAPILLARY BLOOD GLUCOSE        I&O's Summary    11 Dec 2023 07:01  -  12 Dec 2023 07:00  --------------------------------------------------------  IN: 350 mL / OUT: 1420 mL / NET: -1070 mL        PHYSICAL EXAM:  Vital Signs Last 24 Hrs  T(C): 36.4 (12 Dec 2023 06:04), Max: 36.7 (11 Dec 2023 21:40)  T(F): 97.5 (12 Dec 2023 06:04), Max: 98.1 (11 Dec 2023 21:40)  HR: 52 (12 Dec 2023 06:04) (50 - 52)  BP: 137/68 (12 Dec 2023 06:04) (118/62 - 137/68)  BP(mean): --  RR: 18 (12 Dec 2023 06:04) (18 - 18)  SpO2: 100% (12 Dec 2023 06:04) (100% - 100%)    Parameters below as of 12 Dec 2023 06:04  Patient On (Oxygen Delivery Method): room air        PHYSICAL EXAM:  GENERAL: NAD, lying in bed comfortably  HEENT: NC/AT  NECK: Supple, No JVD  CHEST/LUNG: CTAB, no increased WOB  HEART: RRR, no m/r/g  ABDOMEN: soft, NT, ND, BS+  EXTREMITIES:  2+ peripheral pulses, no edema  NERVOUS SYSTEM:  A&Ox3  MSK: FROM all 4 extremities, full and equal strength  SKIN: No rashes or lesions    LABS:                        11.9   5.23  )-----------( 184      ( 12 Dec 2023 06:30 )             37.0     12-12    143  |  108<H>  |  19  ----------------------------<  83  4.1   |  25  |  1.55<H>    Ca    8.9      12 Dec 2023 06:30  Phos  3.2     12-12  Mg     2.20     12-12            Urinalysis Basic - ( 12 Dec 2023 06:30 )    Color: x / Appearance: x / SG: x / pH: x  Gluc: 83 mg/dL / Ketone: x  / Bili: x / Urobili: x   Blood: x / Protein: x / Nitrite: x   Leuk Esterase: x / RBC: x / WBC x   Sq Epi: x / Non Sq Epi: x / Bacteria: x

## 2023-12-12 NOTE — PHYSICAL THERAPY INITIAL EVALUATION ADULT - NSPTDISCHREC_GEN_A_CORE
pt is at baseline function and demonstrating independence with mobility therefore will be discharged from PT service/No skilled PT needs

## 2023-12-12 NOTE — PROGRESS NOTE ADULT - REASON FOR ADMISSION
Rectal lesion

## 2023-12-12 NOTE — PROGRESS NOTE ADULT - ASSESSMENT
72y/o M w/ PMHx afib on xarelto, systolic CHF, CAD s/p PCI (on Plavix), who presented w/ BRBPR, difficulty pooping, and sensation of a rectal lump. CT w/ ill defined lesion 3.2 cm rectum concern for abscess vs neoplasm with possible fistula. CEA mildly elevated to 4.3, leukocytosis resolved. Bedside I&D 12/9 w/ bloody drainage expressed, no purulence.    Recommendations:   - Patient to follow up OP with close GI and colorectal follow up (Dr. Resendiz) to continue work up      A Team Surgery  [id number] 72y/o M w/ PMHx afib on xarelto, systolic CHF, CAD s/p PCI (on Plavix), who presented w/ BRBPR, difficulty pooping, and sensation of a rectal lump. CT w/ ill defined lesion 3.2 cm rectum concern for abscess vs neoplasm with possible fistula. CEA mildly elevated to 4.3, leukocytosis resolved. Bedside I&D 12/9 w/ bloody drainage expressed, no purulence.    Recommendations:   - Patient to follow up OP with close GI and colorectal follow up (Dr. Resendiz) to continue work up      A Team Surgery  [id number]

## 2023-12-14 LAB
CULTURE RESULTS: SIGNIFICANT CHANGE UP
SPECIMEN SOURCE: SIGNIFICANT CHANGE UP

## 2023-12-18 PROBLEM — Z00.00 ENCOUNTER FOR PREVENTIVE HEALTH EXAMINATION: Status: ACTIVE | Noted: 2023-12-18

## 2024-01-10 ENCOUNTER — NON-APPOINTMENT (OUTPATIENT)
Age: 74
End: 2024-01-10

## 2025-01-10 NOTE — ED PROVIDER NOTE - OBJECTIVE STATEMENT
Pt given meal tray-heart rate into the 150's. Heart rate dropped back to the 70's and up to 150's-EKG done - Dr Almaguer aware   73 y/o male with pmhx of HTN, HLD, CHF, CAD, Afib, AICD presenting with bleeding PPM site. Patient reports that his battery was changed in pacemaker 2 weeks ago at [facility]. Pacemaker site started bleeding tonight with no trauma/falls. Reports mild oozing from site with minimal blood loss.  Denies LOC, shortness of breath, chest pain, palpitation, weakness, numbness/tingling.  Denies any redness or pus at site, fever/chills, nausea/vomiting/diarrhea, cough.

## 2025-04-15 NOTE — PROGRESS NOTE ADULT - SUBJECTIVE AND OBJECTIVE BOX
Patient Education   Preventive Care Advice   This is general advice given by our system to help you stay healthy. However, your care team may have specific advice just for you. Please talk to your care team about your preventive care needs.  Nutrition  Eat 5 or more servings of fruits and vegetables each day.  Try wheat bread, brown rice and whole grain pasta (instead of white bread, rice, and pasta).  Get enough calcium and vitamin D. Check the label on foods and aim for 100% of the RDA (recommended daily allowance).  Lifestyle  Exercise at least 150 minutes each week  (30 minutes a day, 5 days a week).  Do muscle strengthening activities 2 days a week. These help control your weight and prevent disease.  No smoking.  Wear sunscreen to prevent skin cancer.  Have a dental exam and cleaning every 6 months.  Yearly exams  See your health care team every year to talk about:  Any changes in your health.  Any medicines your care team has prescribed.  Preventive care, family planning, and ways to prevent chronic diseases.  Shots (vaccines)   HPV shots (up to age 26), if you've never had them before.  Hepatitis B shots (up to age 59), if you've never had them before.  COVID-19 shot: Get this shot when it's due.  Flu shot: Get a flu shot every year.  Tetanus shot: Get a tetanus shot every 10 years.  Pneumococcal, hepatitis A, and RSV shots: Ask your care team if you need these based on your risk.  Shingles shot (for age 50 and up)  General health tests  Diabetes screening:  Starting at age 35, Get screened for diabetes at least every 3 years.  If you are younger than age 35, ask your care team if you should be screened for diabetes.  Cholesterol test: At age 39, start having a cholesterol test every 5 years, or more often if advised.  Bone density scan (DEXA): At age 50, ask your care team if you should have this scan for osteoporosis (brittle bones).  Hepatitis C: Get tested at least once in your life.  STIs (sexually  transmitted infections)  Before age 24: Ask your care team if you should be screened for STIs.  After age 24: Get screened for STIs if you're at risk. You are at risk for STIs (including HIV) if:  You are sexually active with more than one person.  You don't use condoms every time.  You or a partner was diagnosed with a sexually transmitted infection.  If you are at risk for HIV, ask about PrEP medicine to prevent HIV.  Get tested for HIV at least once in your life, whether you are at risk for HIV or not.  Cancer screening tests  Cervical cancer screening: If you have a cervix, begin getting regular cervical cancer screening tests starting at age 21.  Breast cancer scan (mammogram): If you've ever had breasts, begin having regular mammograms starting at age 40. This is a scan to check for breast cancer.  Colon cancer screening: It is important to start screening for colon cancer at age 45.  Have a colonoscopy test every 10 years (or more often if you're at risk) Or, ask your provider about stool tests like a FIT test every year or Cologuard test every 3 years.  To learn more about your testing options, visit:   .  For help making a decision, visit:   https://bit.ly/sg80496.  Prostate cancer screening test: If you have a prostate, ask your care team if a prostate cancer screening test (PSA) at age 55 is right for you.  Lung cancer screening: If you are a current or former smoker ages 50 to 80, ask your care team if ongoing lung cancer screenings are right for you.  For informational purposes only. Not to replace the advice of your health care provider. Copyright   2023 [employer]. All rights reserved. Clinically reviewed by the [facility]. [employer] [id number] - REV 01/24.      INTERVAL: No acute overnight events.   SUBJECTIVE: Patient examined bedside this AM.    OBJECTIVE:  Vital Signs Last 24 Hrs  T(C): 36.5 (11 Dec 2023 06:00), Max: 36.6 (10 Dec 2023 14:46)  T(F): 97.7 (11 Dec 2023 06:00), Max: 97.9 (10 Dec 2023 14:46)  HR: 50 (11 Dec 2023 06:00) (48 - 50)  BP: 125/69 (11 Dec 2023 06:00) (105/59 - 125/69)  RR: 18 (11 Dec 2023 06:00) (18 - 18)  SpO2: 100% (11 Dec 2023 06:00) (100% - 100%)    O2 Parameters below as of 11 Dec 2023 06:00  Patient On (Oxygen Delivery Method): room air    12-10 @ 07:01  -  12-11 @ 07:00  --------------------------------------------------------  IN: 800 mL / OUT: 890 mL / NET: -90 mL    PHYSICAL EXAM:  General: NAD, laying in bed  HEENT: Sclera non-icteric  Respiratory: Clear to ascultation bilaterally, no crackles/rales, no accessory muscle use  Cardiovascular: RRR, no murmurs/rubs/gallops  Abdomen: Soft, NT, ND  Extremities: No LE edema  Skin: No rashes  Neurological: Sensation grossly intact, strength 5/5 in all extremities  Psychiatry: AOx3, appropriate insight/judgement, appropriate affect, recent/remote memory intact    PRN Meds:  acetaminophen     Tablet .. 650 milliGRAM(s) Oral every 6 hours PRN    LABS:                        11.5   6.00  )-----------( 119      ( 10 Dec 2023 06:50 )             35.6     Hgb Trend: 11.5<--, 12.1<--, 13.2<--  12-10    139  |  104  |  24<H>  ----------------------------<  90  4.2   |  25  |  1.52<H>    Ca    9.0      10 Dec 2023 06:50  Phos  3.8     12-10  Mg     2.30     12-10    Creatinine Trend: 1.52<--, 1.60<--, 1.66<--    Urinalysis Basic - ( 10 Dec 2023 06:50 )  Color: x / Appearance: x / SG: x / pH: x  Gluc: 90 mg/dL / Ketone: x  / Bili: x / Urobili: x   Blood: x / Protein: x / Nitrite: x   Leuk Esterase: x / RBC: x / WBC x   Sq Epi: x / Non Sq Epi: x / Bacteria: x    MICROBIOLOGY:   Culture - Blood (collected 09 Dec 2023 05:58)  Source: .Blood Blood  Preliminary Report (10 Dec 2023 09:02):    No growth at 24 hours    Culture - Blood (collected 09 Dec 2023 05:15)  Source: .Blood Blood  Preliminary Report (10 Dec 2023 09:02):    No growth at 24 hours INTERVAL: No acute overnight events.   SUBJECTIVE: Patient examined bedside this AM. Feeling well, no new concerns. No pain.     OBJECTIVE:  Vital Signs Last 24 Hrs  T(C): 36.5 (11 Dec 2023 06:00), Max: 36.6 (10 Dec 2023 14:46)  T(F): 97.7 (11 Dec 2023 06:00), Max: 97.9 (10 Dec 2023 14:46)  HR: 50 (11 Dec 2023 06:00) (48 - 50)  BP: 125/69 (11 Dec 2023 06:00) (105/59 - 125/69)  RR: 18 (11 Dec 2023 06:00) (18 - 18)  SpO2: 100% (11 Dec 2023 06:00) (100% - 100%)    O2 Parameters below as of 11 Dec 2023 06:00  Patient On (Oxygen Delivery Method): room air    12-10 @ 07:01  -  12-11 @ 07:00  --------------------------------------------------------  IN: 800 mL / OUT: 890 mL / NET: -90 mL    PHYSICAL EXAM:  General: NAD, laying in bed  HEENT: Sclera non-icteric  Respiratory: Clear to ascultation bilaterally, no crackles/rales, no accessory muscle use  Cardiovascular: RRR, no murmurs/rubs/gallops  Abdomen: Soft, NT, ND  Extremities: No LE edema  Skin: No rashes  Neurological: Sensation grossly intact, strength 5/5 in all extremities  Psychiatry: AOx3, appropriate insight/judgement, appropriate affect, recent/remote memory intact    PRN Meds:  acetaminophen     Tablet .. 650 milliGRAM(s) Oral every 6 hours PRN    LABS:                        11.5   6.00  )-----------( 119      ( 10 Dec 2023 06:50 )             35.6     Hgb Trend: 11.5<--, 12.1<--, 13.2<--  12-10    139  |  104  |  24<H>  ----------------------------<  90  4.2   |  25  |  1.52<H>    Ca    9.0      10 Dec 2023 06:50  Phos  3.8     12-10  Mg     2.30     12-10    Creatinine Trend: 1.52<--, 1.60<--, 1.66<--    Urinalysis Basic - ( 10 Dec 2023 06:50 )  Color: x / Appearance: x / SG: x / pH: x  Gluc: 90 mg/dL / Ketone: x  / Bili: x / Urobili: x   Blood: x / Protein: x / Nitrite: x   Leuk Esterase: x / RBC: x / WBC x   Sq Epi: x / Non Sq Epi: x / Bacteria: x    MICROBIOLOGY:   Culture - Blood (collected 09 Dec 2023 05:58)  Source: .Blood Blood  Preliminary Report (10 Dec 2023 09:02):    No growth at 24 hours    Culture - Blood (collected 09 Dec 2023 05:15)  Source: .Blood Blood  Preliminary Report (10 Dec 2023 09:02):    No growth at 24 hours

## 2025-05-23 ENCOUNTER — NON-APPOINTMENT (OUTPATIENT)
Age: 75
End: 2025-05-23